# Patient Record
Sex: FEMALE | Race: WHITE | HISPANIC OR LATINO | Employment: OTHER | URBAN - METROPOLITAN AREA
[De-identification: names, ages, dates, MRNs, and addresses within clinical notes are randomized per-mention and may not be internally consistent; named-entity substitution may affect disease eponyms.]

---

## 2017-01-17 ENCOUNTER — ALLSCRIPTS OFFICE VISIT (OUTPATIENT)
Dept: OTHER | Facility: OTHER | Age: 42
End: 2017-01-17

## 2017-07-17 ENCOUNTER — HOSPITAL ENCOUNTER (EMERGENCY)
Facility: HOSPITAL | Age: 42
Discharge: HOME | End: 2017-07-18
Attending: EMERGENCY MEDICINE | Admitting: EMERGENCY MEDICINE
Payer: COMMERCIAL

## 2017-07-17 ENCOUNTER — ALLSCRIPTS OFFICE VISIT (OUTPATIENT)
Dept: OTHER | Facility: OTHER | Age: 42
End: 2017-07-17

## 2017-07-17 ENCOUNTER — APPOINTMENT (EMERGENCY)
Dept: RADIOLOGY | Facility: HOSPITAL | Age: 42
End: 2017-07-17
Payer: COMMERCIAL

## 2017-07-17 DIAGNOSIS — S89.92XA LEFT KNEE INJURY, INITIAL ENCOUNTER: ICD-10-CM

## 2017-07-17 DIAGNOSIS — S99.929A FOOT INJURY: Primary | ICD-10-CM

## 2017-07-17 PROCEDURE — 73564 X-RAY EXAM KNEE 4 OR MORE: CPT | Performed by: EMERGENCY MEDICINE

## 2017-07-17 PROCEDURE — 73610 X-RAY EXAM OF ANKLE: CPT | Performed by: EMERGENCY MEDICINE

## 2017-07-18 ENCOUNTER — APPOINTMENT (EMERGENCY)
Dept: RADIOLOGY | Facility: HOSPITAL | Age: 42
End: 2017-07-18
Payer: COMMERCIAL

## 2017-07-18 VITALS
TEMPERATURE: 98.9 F | BODY MASS INDEX: 23.92 KG/M2 | RESPIRATION RATE: 17 BRPM | OXYGEN SATURATION: 100 % | HEIGHT: 62 IN | SYSTOLIC BLOOD PRESSURE: 128 MMHG | WEIGHT: 130 LBS | HEART RATE: 77 BPM | DIASTOLIC BLOOD PRESSURE: 67 MMHG

## 2017-07-18 PROCEDURE — 73630 X-RAY EXAM OF FOOT: CPT

## 2017-07-18 PROCEDURE — 99283 EMERGENCY DEPT VISIT LOW MDM: CPT

## 2017-07-18 RX ORDER — IBUPROFEN 400 MG/1
400 TABLET ORAL ONCE
Status: COMPLETED | OUTPATIENT
Start: 2017-07-18 | End: 2017-07-18

## 2017-07-18 RX ORDER — NAPROXEN 500 MG/1
500 TABLET ORAL 2 TIMES DAILY WITH MEALS
Qty: 15 TABLET | Refills: 0 | Status: SHIPPED | OUTPATIENT
Start: 2017-07-18 | End: 2018-07-25 | Stop reason: ALTCHOICE

## 2017-07-18 RX ADMIN — IBUPROFEN 400 MG: 400 TABLET ORAL at 00:45

## 2018-01-13 VITALS
HEART RATE: 94 BPM | DIASTOLIC BLOOD PRESSURE: 65 MMHG | SYSTOLIC BLOOD PRESSURE: 101 MMHG | HEIGHT: 62 IN | WEIGHT: 163.06 LBS | BODY MASS INDEX: 30.01 KG/M2

## 2018-01-14 VITALS
BODY MASS INDEX: 28.89 KG/M2 | SYSTOLIC BLOOD PRESSURE: 118 MMHG | WEIGHT: 157 LBS | HEART RATE: 97 BPM | RESPIRATION RATE: 17 BRPM | HEIGHT: 62 IN | OXYGEN SATURATION: 99 % | DIASTOLIC BLOOD PRESSURE: 70 MMHG

## 2018-01-15 NOTE — MISCELLANEOUS
Message   Recorded as Task   Date: 03/10/2016 01:09 PM, Created By: Amanda Archibald   Task Name: Call Back   Assigned To: Oly Yusuf   Regarding Patient: Jamshid Hong, Status: Active   Comment:    Amanda Archibald - 10 Mar 2016 1:09 PM     TASK CREATED  Caller: josey, Parent; Care Coordination; [phone]    pts mom called said this medication is not working and you told her to call and let you know and you would raise the mg     amitriptylia currently taking 25 mg- 1 daily   Betsy Manriquez - 10 Mar 2016 1:09 PM     TASK REASSIGNED: Previously Assigned To [facility],team  pt uses Jimmie Garcia - 10 Mar 2016 3:08 PM     TASK EDITED  Spoke with Paulina's mom  She started to get headaches toward the end of the day  We will increase the amitriptyline by 10 mg up to 35 mg total        Plan  Migraine headache    · Amitriptyline HCl - 10 MG Oral Tablet;  Take 1 tablet by mouth at bedtime    Signatures   Electronically signed by : Marcella Solis MD; Mar 10 2016  3:08PM EST                       (Author)

## 2018-01-15 NOTE — MISCELLANEOUS
Message   Recorded as Task   Date: 01/11/2016 08:26 AM, Created By: David Jacques   Task Name: Call Back   Assigned To: Marguarite Beverage   Regarding Patient: Areli Rust, Status: Active   Comment:    David Jacques - 11 Jan 2016 8:26 AM     TASK CREATED  PAULINA CALLED SAID THIS MEDICATION IS NOT COVERED UNDER HER INSURANCE AND ITS TO EXPENSIVE, PATIENT ASKED IF SHE COULD GET SOMETHING SIMILAR THAT WOULD BE COVERED  PTS # [phone]  SHE USES [facility] IN [facility]  Marguarite Beverage - 11 Jan 2016 3:51 PM     TASK EDITED  I spoke with Paulina's mother reports that the Depakote sprinkles are not covered under her policy   We'll try the regular 125 mg Depakote pills        Plan  Migraine headache    · Divalproex Sodium 125 MG Oral Tablet Delayed Release; TAKE 1 TABLET EVERY  12 HOURS WITH FOOD    Signatures   Electronically signed by : Latoya Treviño MD; Jan 11 2016  3:51PM EST                       (Author)

## 2018-03-07 DIAGNOSIS — G43.009 MIGRAINE WITHOUT AURA AND WITHOUT STATUS MIGRAINOSUS, NOT INTRACTABLE: Primary | ICD-10-CM

## 2018-03-07 RX ORDER — DIVALPROEX SODIUM 125 MG/1
TABLET, DELAYED RELEASE ORAL
Qty: 60 TABLET | Refills: 1 | Status: SHIPPED | OUTPATIENT
Start: 2018-03-07 | End: 2018-05-17 | Stop reason: SDUPTHER

## 2018-05-17 DIAGNOSIS — G43.009 MIGRAINE WITHOUT AURA AND WITHOUT STATUS MIGRAINOSUS, NOT INTRACTABLE: ICD-10-CM

## 2018-05-17 RX ORDER — DIVALPROEX SODIUM 125 MG/1
TABLET, DELAYED RELEASE ORAL
Qty: 60 TABLET | Refills: 5 | Status: SHIPPED | OUTPATIENT
Start: 2018-05-17 | End: 2018-11-05

## 2018-05-31 DIAGNOSIS — G43.009 MIGRAINE WITHOUT AURA AND WITHOUT STATUS MIGRAINOSUS, NOT INTRACTABLE: Primary | ICD-10-CM

## 2018-05-31 RX ORDER — AMITRIPTYLINE HYDROCHLORIDE 50 MG/1
50 TABLET, FILM COATED ORAL
Qty: 30 TABLET | Refills: 5 | Status: SHIPPED | OUTPATIENT
Start: 2018-05-31 | End: 2018-11-21 | Stop reason: SDUPTHER

## 2018-07-25 ENCOUNTER — OFFICE VISIT (OUTPATIENT)
Dept: NEUROLOGY | Facility: CLINIC | Age: 43
End: 2018-07-25
Payer: COMMERCIAL

## 2018-07-25 VITALS
HEIGHT: 62 IN | SYSTOLIC BLOOD PRESSURE: 100 MMHG | DIASTOLIC BLOOD PRESSURE: 60 MMHG | WEIGHT: 162.8 LBS | HEART RATE: 90 BPM | BODY MASS INDEX: 29.96 KG/M2

## 2018-07-25 DIAGNOSIS — G43.009 MIGRAINE WITHOUT AURA AND WITHOUT STATUS MIGRAINOSUS, NOT INTRACTABLE: Primary | ICD-10-CM

## 2018-07-25 PROCEDURE — 99213 OFFICE O/P EST LOW 20 MIN: CPT | Performed by: PSYCHIATRY & NEUROLOGY

## 2018-07-25 RX ORDER — SUMATRIPTAN 50 MG/1
50 TABLET, FILM COATED ORAL 2 TIMES DAILY PRN
COMMUNITY
Start: 2018-06-26 | End: 2018-07-25 | Stop reason: SDUPTHER

## 2018-07-25 RX ORDER — SUMATRIPTAN 50 MG/1
50 TABLET, FILM COATED ORAL 2 TIMES DAILY PRN
Qty: 9 TABLET | Refills: 5 | Status: SHIPPED | OUTPATIENT
Start: 2018-07-25 | End: 2019-02-04 | Stop reason: SDUPTHER

## 2018-07-25 NOTE — PROGRESS NOTES
Addi Dominguez is a 38 y o  female returns with history of migraine headache    Assessment:  1  Migraine without aura and without status migrainosus, not intractable        Plan:  Continue current medication  Follow-up 6 months    Discussion:  Diabetes headaches remain under good control with present management  She will continue Imitrex as needed for her headaches and she will follow up with me in 6 months      Subjective:    HPI  Tila Knapp returns in follow-up today with her mother  Her headaches remain under good control having about 1 headache a week  She was not finding the Maxalt to be effective in stopping her headache under primary doctor gave her 50 mg of Imitrex and this seems to be helpful  Typically 1 pill gets rid of her headache  She tolerates it well  She denies any other medical issues      Past Medical History:   Diagnosis Date    Hearing loss - left     Migraine     Seizures (HCC)        Family History:  Family History   Problem Relation Age of Onset    Cancer Mother     Arthritis Mother     Liver disease Father        Past Surgical History:  History reviewed  No pertinent surgical history  Social History:   reports that she has never smoked  She has never used smokeless tobacco  She reports that she does not drink alcohol or use drugs  Allergies:  Patient has no known allergies        Current Outpatient Prescriptions:     amitriptyline (ELAVIL) 50 mg tablet, Take 1 tablet (50 mg total) by mouth daily at bedtime, Disp: 30 tablet, Rfl: 5    divalproex sodium (DEPAKOTE) 125 mg EC tablet, TAKE 1 TABLET BY MOUTH EVERY 12 HOURS WITH FOOD, Disp: 60 tablet, Rfl: 5    SUMAtriptan (IMITREX) 50 mg tablet, Take 50 mg by mouth 2 (two) times a day as needed, Disp: , Rfl:     I have reviewed the past medical, social and family history, current medications, allergies, vitals, review of systems and updated this information as appropriate today     Objective:    Vitals:  Blood pressure 100/60, pulse 90, height 5' 1 75" (1 568 m), weight 73 8 kg (162 lb 12 8 oz)  Physical Exam    Neurological Exam  GENERAL:  Well-developed well-nourished woman in no acute distress  HEENT/NECK: Head is atraumatic normocephalic, neck is supple  NEUROLOGIC:  Mental Status: Awake and alert without aphasia  Cranial Nerves: Extraocular movements are full  Face is symmetrical  Motor:  No drift is noted on arm extension  Coordination:  Finger-to-nose testing is performed accurately  Romberg is negative  Gait is stable  Reflexes:  1/4 and symmetrical            ROS:    Review of Systems   Constitutional: Negative for chills, fatigue and fever  HENT: Negative for congestion, sinus pain, sinus pressure, tinnitus and trouble swallowing  Eyes: Negative for pain, discharge and visual disturbance  Respiratory: Negative for cough, shortness of breath and wheezing  Cardiovascular: Negative  Gastrointestinal: Positive for abdominal pain  Negative for abdominal distention, nausea and vomiting  Endocrine: Negative for cold intolerance and heat intolerance  Genitourinary: Negative for difficulty urinating, frequency, hematuria and urgency  Musculoskeletal: Positive for arthralgias  Negative for back pain, gait problem, neck pain and neck stiffness  Skin: Negative for rash and wound  Allergic/Immunologic: Negative for environmental allergies and food allergies  Neurological: Negative  Hematological: Negative  Psychiatric/Behavioral: Negative for confusion, decreased concentration and sleep disturbance

## 2018-11-01 ENCOUNTER — TELEPHONE (OUTPATIENT)
Dept: NEUROLOGY | Facility: CLINIC | Age: 43
End: 2018-11-01

## 2018-11-01 NOTE — TELEPHONE ENCOUNTER
Patient's mother is requesting the depakote sprinkles, she states they are no longer able to get the med without flavoring added (which causes patient stomach upset) was told the  will no longer be making it without flavors

## 2018-11-05 DIAGNOSIS — G43.009 MIGRAINE WITHOUT AURA AND WITHOUT STATUS MIGRAINOSUS, NOT INTRACTABLE: ICD-10-CM

## 2018-11-05 RX ORDER — DIVALPROEX SODIUM 250 MG/1
250 TABLET, EXTENDED RELEASE ORAL DAILY
Qty: 30 TABLET | Refills: 5 | Status: SHIPPED | OUTPATIENT
Start: 2018-11-05 | End: 2018-11-28

## 2018-11-05 NOTE — TELEPHONE ENCOUNTER
I spoke with Myriam Cande in her mother and will put her on the 250 mg extended-release tablet to be taken 1 at bedtime

## 2018-11-21 DIAGNOSIS — G43.009 MIGRAINE WITHOUT AURA AND WITHOUT STATUS MIGRAINOSUS, NOT INTRACTABLE: ICD-10-CM

## 2018-11-21 RX ORDER — AMITRIPTYLINE HYDROCHLORIDE 50 MG/1
TABLET, FILM COATED ORAL
Qty: 30 TABLET | Refills: 5 | Status: SHIPPED | OUTPATIENT
Start: 2018-11-21 | End: 2019-05-20 | Stop reason: SDUPTHER

## 2018-11-28 ENCOUNTER — TELEPHONE (OUTPATIENT)
Dept: NEUROLOGY | Facility: CLINIC | Age: 43
End: 2018-11-28

## 2018-11-28 DIAGNOSIS — G43.009 MIGRAINE WITHOUT AURA AND WITHOUT STATUS MIGRAINOSUS, NOT INTRACTABLE: Primary | ICD-10-CM

## 2018-11-28 RX ORDER — DIVALPROEX SODIUM 125 MG/1
125 TABLET, DELAYED RELEASE ORAL EVERY 12 HOURS SCHEDULED
Qty: 60 TABLET | Refills: 5 | Status: SHIPPED | OUTPATIENT
Start: 2018-11-28 | End: 2018-11-29

## 2018-11-28 NOTE — TELEPHONE ENCOUNTER
Mother Morgan Salguero called questioning if she can cut depakote in half so pt can take BID as she feels taking hs not working  She is starting to get HA's again  I advised her  extended release cannot be cut  Mother would like pt switched to IR  Please advise       [phone]

## 2018-11-29 DIAGNOSIS — G43.009 MIGRAINE WITHOUT AURA AND WITHOUT STATUS MIGRAINOSUS, NOT INTRACTABLE: ICD-10-CM

## 2018-11-29 RX ORDER — DIVALPROEX SODIUM 125 MG/1
125 CAPSULE, COATED PELLETS ORAL EVERY 12 HOURS SCHEDULED
Qty: 60 CAPSULE | Refills: 5 | Status: SHIPPED | OUTPATIENT
Start: 2018-11-29 | End: 2019-05-18 | Stop reason: SDUPTHER

## 2018-11-29 NOTE — TELEPHONE ENCOUNTER
The reason these changes began a month ago was because she did not want to be on the sprinkles anymore because of the flavor ring    I sent another prescription to the pharmacy

## 2018-11-29 NOTE — TELEPHONE ENCOUNTER
Patient's mother requesting rx for depakote sprinkles  She did not  the rx that was sent yesterday because she states it is flavored and the patient can't tolerate this  Please send sprinkles to the [facility] on file

## 2019-01-10 ENCOUNTER — TELEPHONE (OUTPATIENT)
Dept: NEUROLOGY | Facility: CLINIC | Age: 44
End: 2019-01-10

## 2019-02-04 ENCOUNTER — OFFICE VISIT (OUTPATIENT)
Dept: NEUROLOGY | Facility: CLINIC | Age: 44
End: 2019-02-04
Payer: COMMERCIAL

## 2019-02-04 VITALS
BODY MASS INDEX: 30.4 KG/M2 | HEART RATE: 94 BPM | HEIGHT: 62 IN | DIASTOLIC BLOOD PRESSURE: 80 MMHG | WEIGHT: 165.2 LBS | SYSTOLIC BLOOD PRESSURE: 110 MMHG

## 2019-02-04 DIAGNOSIS — G43.009 MIGRAINE WITHOUT AURA AND WITHOUT STATUS MIGRAINOSUS, NOT INTRACTABLE: Primary | ICD-10-CM

## 2019-02-04 PROCEDURE — 99213 OFFICE O/P EST LOW 20 MIN: CPT | Performed by: PSYCHIATRY & NEUROLOGY

## 2019-02-04 RX ORDER — SUMATRIPTAN 50 MG/1
50 TABLET, FILM COATED ORAL 2 TIMES DAILY PRN
Qty: 9 TABLET | Refills: 5 | Status: SHIPPED | OUTPATIENT
Start: 2019-02-04 | End: 2020-05-15

## 2019-02-04 RX ORDER — SUMATRIPTAN 50 MG/1
50 TABLET, FILM COATED ORAL
COMMUNITY
Start: 2018-08-01 | End: 2019-02-04 | Stop reason: SDUPTHER

## 2019-02-04 NOTE — PROGRESS NOTES
Laura Garza is a 41 y o  female returns in follow-up today with history of migraine headache    Assessment:  1  Migraine without aura and without status migrainosus, not intractable        Plan:  Continue Depakote and amitriptyline daily for prophylaxis  Imitrex as needed for breakthrough headache  Follow-up 6 months    Discussion:  Jose Padorn returns in follow-up today with her mother  They report that her headaches are currently under good control with current medications  She uses Imitrex a few times a month and it is effective in stopping her headache  She will continue with current management and follow up in 6 months      Subjective:    HPI  Jose Padron returns in follow-up today with her mother  They report that her headaches are under good control with current management  She remains on Depakote and amitriptyline daily for prophylaxis and tolerates these well  She gets a headache about 2 or 3 times a month and finds that 50 mg of Imitrex is effective in stopping her headache when she gets it  She has no problems tolerating her medications  They deny any new medical issues      Past Medical History:   Diagnosis Date    Hearing loss - left     Migraine     Seizures ([facility] [address] )        Family History:  Family History   Problem Relation Age of Onset    Cancer Mother     Arthritis Mother     Liver disease Father        Past Surgical History:  History reviewed  No pertinent surgical history  Social History:   reports that she has never smoked  She has never used smokeless tobacco  She reports that she does not drink alcohol or use drugs  Allergies:  Patient has no known allergies        Current Outpatient Prescriptions:     amitriptyline (ELAVIL) 50 mg tablet, TAKE 1 TABLET BY MOUTH AT BEDTIME, Disp: 30 tablet, Rfl: 5    divalproex sodium (DEPAKOTE SPRINKLE) 125 MG capsule, Take 1 capsule (125 mg total) by mouth every 12 (twelve) hours, Disp: 60 capsule, Rfl: 5    SUMAtriptan (IMITREX) 50 mg tablet, Take 1 tablet (50 mg total) by mouth 2 (two) times a day as needed for migraine, Disp: 9 tablet, Rfl: 5    I have reviewed the past medical, social and family history, current medications, allergies, vitals, review of systems and updated this information as appropriate today     Objective:    Vitals:  Blood pressure 110/80, pulse 94, height 5' 1 75" (1 568 m), weight 74 9 kg (165 lb 3 2 oz)  Physical Exam    Neurological Exam  GENERAL:  Well-developed well-nourished woman in no acute distress  HEENT/NECK: Head is atraumatic normocephalic, neck is supple  NEUROLOGIC:  Mental Status: Awake and alert without aphasia  Cranial Nerves: Extraocular movements are full  Face is symmetrical  Motor:  No drift is noted on arm extension  Coordination:  Finger-to-nose testing is performed accurately  Romberg is negative  Gait is stable  Reflexes:  2/4 and symmetrical            ROS:    Review of Systems   Constitutional: Positive for fatigue  HENT: Positive for hearing loss  Eyes: Negative  Respiratory: Negative  Cardiovascular: Negative  Gastrointestinal: Negative  Negative for nausea and vomiting  Endocrine: Negative  Genitourinary: Negative  Negative for difficulty urinating, frequency and urgency  Musculoskeletal: Positive for arthralgias (Left knee pain)  Skin: Negative  Allergic/Immunologic: Negative  Neurological: Positive for facial asymmetry, speech difficulty, light-headedness, numbness and headaches  Hematological: Bruises/bleeds easily  Psychiatric/Behavioral: Negative  Negative for confusion, decreased concentration, hallucinations and sleep disturbance

## 2019-05-18 DIAGNOSIS — G43.009 MIGRAINE WITHOUT AURA AND WITHOUT STATUS MIGRAINOSUS, NOT INTRACTABLE: ICD-10-CM

## 2019-05-20 DIAGNOSIS — G43.009 MIGRAINE WITHOUT AURA AND WITHOUT STATUS MIGRAINOSUS, NOT INTRACTABLE: ICD-10-CM

## 2019-05-20 RX ORDER — AMITRIPTYLINE HYDROCHLORIDE 50 MG/1
50 TABLET, FILM COATED ORAL
Qty: 30 TABLET | Refills: 5 | Status: SHIPPED | OUTPATIENT
Start: 2019-05-20 | End: 2019-11-14 | Stop reason: SDUPTHER

## 2019-05-20 RX ORDER — DIVALPROEX SODIUM 125 MG/1
CAPSULE, COATED PELLETS ORAL
Qty: 60 CAPSULE | Refills: 0 | Status: SHIPPED | OUTPATIENT
Start: 2019-05-20 | End: 2019-06-17 | Stop reason: SDUPTHER

## 2019-06-17 DIAGNOSIS — G43.009 MIGRAINE WITHOUT AURA AND WITHOUT STATUS MIGRAINOSUS, NOT INTRACTABLE: ICD-10-CM

## 2019-06-17 RX ORDER — DIVALPROEX SODIUM 125 MG/1
CAPSULE, COATED PELLETS ORAL
Qty: 60 CAPSULE | Refills: 5 | Status: SHIPPED | OUTPATIENT
Start: 2019-06-17 | End: 2019-12-14 | Stop reason: SDUPTHER

## 2019-08-05 ENCOUNTER — TELEPHONE (OUTPATIENT)
Dept: NEUROLOGY | Facility: CLINIC | Age: 44
End: 2019-08-05

## 2019-08-05 ENCOUNTER — OFFICE VISIT (OUTPATIENT)
Dept: NEUROLOGY | Facility: CLINIC | Age: 44
End: 2019-08-05
Payer: COMMERCIAL

## 2019-08-05 VITALS
BODY MASS INDEX: 29.19 KG/M2 | WEIGHT: 158.6 LBS | DIASTOLIC BLOOD PRESSURE: 70 MMHG | HEIGHT: 62 IN | HEART RATE: 92 BPM | SYSTOLIC BLOOD PRESSURE: 100 MMHG

## 2019-08-05 DIAGNOSIS — G43.009 MIGRAINE WITHOUT AURA AND WITHOUT STATUS MIGRAINOSUS, NOT INTRACTABLE: Primary | ICD-10-CM

## 2019-08-05 PROCEDURE — 99213 OFFICE O/P EST LOW 20 MIN: CPT | Performed by: PSYCHIATRY & NEUROLOGY

## 2019-08-05 NOTE — PROGRESS NOTES
Maicol Engel is a 41 y o  female returns in follow-up today with history of migraine headache    Assessment:  1  Migraine without aura and without status migrainosus, not intractable        Plan:  Continue Depakote and Elavil  Imitrex as needed  Follow-up 6 months    Discussion:  Paulina some migraine headaches remain under good control occurring less than once a week with current management  Imitrex is effective in stopping the headache  She will continue with these and follow-up with me in 6 months      Subjective:    MICHELE Hess returns in follow-up today accompanied by her mother  She reports that she has been doing well with respect to her headaches  She states she has headache less than once a week and when she gets 1 the Imitrex is effective in stopping it  She continues on Elavil and Depakote as preventive treatments and the seem to be effective  She has no problems tolerating them  She denies any other health issues  Past Medical History:   Diagnosis Date    Hearing loss - left     Migraine     Seizures (HCC)        Family History:  Family History   Problem Relation Age of Onset    Cancer Mother     Arthritis Mother     Liver disease Father        Past Surgical History:  History reviewed  No pertinent surgical history  Social History:   reports that she has never smoked  She has never used smokeless tobacco  She reports that she does not drink alcohol or use drugs  Allergies:  Patient has no known allergies        Current Outpatient Medications:     amitriptyline (ELAVIL) 50 mg tablet, Take 1 tablet (50 mg total) by mouth daily at bedtime, Disp: 30 tablet, Rfl: 5    divalproex sodium (DEPAKOTE SPRINKLE) 125 MG capsule, TAKE 1 CAPSULE(125 MG) BY MOUTH EVERY 12 HOURS, Disp: 60 capsule, Rfl: 5    SUMAtriptan (IMITREX) 50 mg tablet, Take 1 tablet (50 mg total) by mouth 2 (two) times a day as needed for migraine, Disp: 9 tablet, Rfl: 5    I have reviewed the past medical, social and family history, current medications, allergies, vitals, review of systems and updated this information as appropriate today     Objective:    Vitals:  Blood pressure 100/70, pulse 92, height 5' 1 75" (1 568 m), weight 71 9 kg (158 lb 9 6 oz)  Physical Exam    Neurological Exam  GENERAL:  Well-developed well-nourished woman in no acute distress  HEENT/NECK: Head is atraumatic normocephalic, neck is supple  NEUROLOGIC:  Mental Status: Awake and alert without aphasia  Cranial Nerves: Extraocular movements are full  Face is symmetrical  Motor:  No drift is noted on arm extension  Coordination:  Finger-to-nose testing is performed accurately  Romberg is negative  Gait is stable  Reflexes:  2/4 and symmetrical            ROS:    Review of Systems   Constitutional: Negative  Negative for appetite change and fever  HENT: Negative  Negative for hearing loss, tinnitus, trouble swallowing and voice change  Eyes: Negative  Negative for photophobia and pain  Respiratory: Negative  Negative for shortness of breath  Cardiovascular: Negative  Negative for palpitations  Gastrointestinal: Negative  Negative for nausea and vomiting  Endocrine: Negative  Negative for cold intolerance and heat intolerance  Genitourinary: Negative  Negative for dysuria, frequency and urgency  Musculoskeletal: Negative  Negative for myalgias and neck pain  Skin: Negative  Negative for rash  Neurological: Negative  Negative for dizziness, tremors, seizures, syncope, facial asymmetry, speech difficulty, weakness, light-headedness, numbness and headaches  Hematological: Negative  Does not bruise/bleed easily  Psychiatric/Behavioral: Negative  Negative for confusion, hallucinations and sleep disturbance  All other systems reviewed and are negative

## 2019-11-14 DIAGNOSIS — G43.009 MIGRAINE WITHOUT AURA AND WITHOUT STATUS MIGRAINOSUS, NOT INTRACTABLE: ICD-10-CM

## 2019-11-14 RX ORDER — AMITRIPTYLINE HYDROCHLORIDE 50 MG/1
TABLET, FILM COATED ORAL
Qty: 30 TABLET | Refills: 5 | Status: SHIPPED | OUTPATIENT
Start: 2019-11-14 | End: 2020-05-08

## 2019-12-14 DIAGNOSIS — G43.009 MIGRAINE WITHOUT AURA AND WITHOUT STATUS MIGRAINOSUS, NOT INTRACTABLE: ICD-10-CM

## 2019-12-16 RX ORDER — DIVALPROEX SODIUM 125 MG/1
CAPSULE, COATED PELLETS ORAL
Qty: 60 CAPSULE | Refills: 0 | Status: SHIPPED | OUTPATIENT
Start: 2019-12-16 | End: 2020-01-13

## 2020-01-13 DIAGNOSIS — G43.009 MIGRAINE WITHOUT AURA AND WITHOUT STATUS MIGRAINOSUS, NOT INTRACTABLE: ICD-10-CM

## 2020-01-13 RX ORDER — DIVALPROEX SODIUM 125 MG/1
CAPSULE, COATED PELLETS ORAL
Qty: 60 CAPSULE | Refills: 5 | Status: SHIPPED | OUTPATIENT
Start: 2020-01-13 | End: 2020-01-13

## 2020-01-13 RX ORDER — DIVALPROEX SODIUM 125 MG/1
CAPSULE, COATED PELLETS ORAL
Qty: 180 CAPSULE | Refills: 5 | Status: SHIPPED | OUTPATIENT
Start: 2020-01-13 | End: 2021-01-28

## 2020-05-08 ENCOUNTER — OFFICE VISIT (OUTPATIENT)
Dept: NEUROLOGY | Facility: CLINIC | Age: 45
End: 2020-05-08
Payer: COMMERCIAL

## 2020-05-08 VITALS
HEART RATE: 74 BPM | SYSTOLIC BLOOD PRESSURE: 102 MMHG | WEIGHT: 162 LBS | BODY MASS INDEX: 29.81 KG/M2 | HEIGHT: 62 IN | DIASTOLIC BLOOD PRESSURE: 60 MMHG

## 2020-05-08 DIAGNOSIS — G43.009 MIGRAINE WITHOUT AURA AND WITHOUT STATUS MIGRAINOSUS, NOT INTRACTABLE: ICD-10-CM

## 2020-05-08 DIAGNOSIS — G43.009 MIGRAINE WITHOUT AURA AND WITHOUT STATUS MIGRAINOSUS, NOT INTRACTABLE: Primary | ICD-10-CM

## 2020-05-08 PROCEDURE — 99213 OFFICE O/P EST LOW 20 MIN: CPT | Performed by: PSYCHIATRY & NEUROLOGY

## 2020-05-08 RX ORDER — AMITRIPTYLINE HYDROCHLORIDE 50 MG/1
TABLET, FILM COATED ORAL
Qty: 30 TABLET | Refills: 5 | Status: SHIPPED | OUTPATIENT
Start: 2020-05-08 | End: 2020-10-30

## 2020-05-14 DIAGNOSIS — G43.009 MIGRAINE WITHOUT AURA AND WITHOUT STATUS MIGRAINOSUS, NOT INTRACTABLE: ICD-10-CM

## 2020-05-15 RX ORDER — SUMATRIPTAN 50 MG/1
TABLET, FILM COATED ORAL
Qty: 9 TABLET | Refills: 5 | Status: SHIPPED | OUTPATIENT
Start: 2020-05-15 | End: 2021-06-21 | Stop reason: SDUPTHER

## 2020-10-30 DIAGNOSIS — G43.009 MIGRAINE WITHOUT AURA AND WITHOUT STATUS MIGRAINOSUS, NOT INTRACTABLE: ICD-10-CM

## 2020-10-30 RX ORDER — AMITRIPTYLINE HYDROCHLORIDE 50 MG/1
TABLET, FILM COATED ORAL
Qty: 30 TABLET | Refills: 5 | Status: SHIPPED | OUTPATIENT
Start: 2020-10-30 | End: 2021-04-28

## 2020-11-09 ENCOUNTER — OFFICE VISIT (OUTPATIENT)
Dept: NEUROLOGY | Facility: CLINIC | Age: 45
End: 2020-11-09
Payer: COMMERCIAL

## 2020-11-09 VITALS
WEIGHT: 160 LBS | HEIGHT: 62 IN | SYSTOLIC BLOOD PRESSURE: 102 MMHG | HEART RATE: 92 BPM | DIASTOLIC BLOOD PRESSURE: 70 MMHG | BODY MASS INDEX: 29.44 KG/M2

## 2020-11-09 DIAGNOSIS — G43.009 MIGRAINE WITHOUT AURA AND WITHOUT STATUS MIGRAINOSUS, NOT INTRACTABLE: Primary | ICD-10-CM

## 2020-11-09 PROCEDURE — 99213 OFFICE O/P EST LOW 20 MIN: CPT | Performed by: PSYCHIATRY & NEUROLOGY

## 2020-11-10 ENCOUNTER — TELEPHONE (OUTPATIENT)
Dept: NEUROLOGY | Facility: CLINIC | Age: 45
End: 2020-11-10

## 2020-11-10 ENCOUNTER — APPOINTMENT (OUTPATIENT)
Dept: LAB | Facility: HOSPITAL | Age: 45
End: 2020-11-10
Attending: PSYCHIATRY & NEUROLOGY
Payer: COMMERCIAL

## 2020-11-10 DIAGNOSIS — G43.009 MIGRAINE WITHOUT AURA AND WITHOUT STATUS MIGRAINOSUS, NOT INTRACTABLE: ICD-10-CM

## 2020-11-10 LAB
ALBUMIN SERPL BCP-MCNC: 3.4 G/DL (ref 3.5–5)
ALP SERPL-CCNC: 50 U/L (ref 46–116)
ALT SERPL W P-5'-P-CCNC: 17 U/L (ref 12–78)
AST SERPL W P-5'-P-CCNC: 9 U/L (ref 5–45)
BASOPHILS # BLD AUTO: 0.03 THOUSANDS/ΜL (ref 0–0.1)
BASOPHILS NFR BLD AUTO: 1 % (ref 0–1)
BILIRUB DIRECT SERPL-MCNC: 0.09 MG/DL (ref 0–0.2)
BILIRUB SERPL-MCNC: 0.3 MG/DL (ref 0.2–1)
EOSINOPHIL # BLD AUTO: 0.03 THOUSAND/ΜL (ref 0–0.61)
EOSINOPHIL NFR BLD AUTO: 1 % (ref 0–6)
ERYTHROCYTE [DISTWIDTH] IN BLOOD BY AUTOMATED COUNT: 15.2 % (ref 11.6–15.1)
HCT VFR BLD AUTO: 34.7 % (ref 34.8–46.1)
HGB BLD-MCNC: 10.4 G/DL (ref 11.5–15.4)
IMM GRANULOCYTES # BLD AUTO: 0.01 THOUSAND/UL (ref 0–0.2)
IMM GRANULOCYTES NFR BLD AUTO: 0 % (ref 0–2)
LYMPHOCYTES # BLD AUTO: 1.12 THOUSANDS/ΜL (ref 0.6–4.47)
LYMPHOCYTES NFR BLD AUTO: 27 % (ref 14–44)
MCH RBC QN AUTO: 25.6 PG (ref 26.8–34.3)
MCHC RBC AUTO-ENTMCNC: 30 G/DL (ref 31.4–37.4)
MCV RBC AUTO: 85 FL (ref 82–98)
MONOCYTES # BLD AUTO: 0.24 THOUSAND/ΜL (ref 0.17–1.22)
MONOCYTES NFR BLD AUTO: 6 % (ref 4–12)
NEUTROPHILS # BLD AUTO: 2.74 THOUSANDS/ΜL (ref 1.85–7.62)
NEUTS SEG NFR BLD AUTO: 65 % (ref 43–75)
NRBC BLD AUTO-RTO: 0 /100 WBCS
PLATELET # BLD AUTO: 173 THOUSANDS/UL (ref 149–390)
PMV BLD AUTO: 8.8 FL (ref 8.9–12.7)
PROT SERPL-MCNC: 7.3 G/DL (ref 6.4–8.2)
RBC # BLD AUTO: 4.07 MILLION/UL (ref 3.81–5.12)
WBC # BLD AUTO: 4.17 THOUSAND/UL (ref 4.31–10.16)

## 2020-11-10 PROCEDURE — 80076 HEPATIC FUNCTION PANEL: CPT

## 2020-11-10 PROCEDURE — 85025 COMPLETE CBC W/AUTO DIFF WBC: CPT

## 2020-11-10 PROCEDURE — 36415 COLL VENOUS BLD VENIPUNCTURE: CPT

## 2021-01-28 DIAGNOSIS — G43.009 MIGRAINE WITHOUT AURA AND WITHOUT STATUS MIGRAINOSUS, NOT INTRACTABLE: ICD-10-CM

## 2021-01-28 RX ORDER — DIVALPROEX SODIUM 125 MG/1
CAPSULE, COATED PELLETS ORAL
Qty: 180 CAPSULE | Refills: 5 | Status: SHIPPED | OUTPATIENT
Start: 2021-01-28 | End: 2021-06-21 | Stop reason: SDUPTHER

## 2021-04-28 DIAGNOSIS — G43.009 MIGRAINE WITHOUT AURA AND WITHOUT STATUS MIGRAINOSUS, NOT INTRACTABLE: ICD-10-CM

## 2021-04-28 RX ORDER — AMITRIPTYLINE HYDROCHLORIDE 50 MG/1
TABLET, FILM COATED ORAL
Qty: 30 TABLET | Refills: 5 | Status: SHIPPED | OUTPATIENT
Start: 2021-04-28 | End: 2021-09-16

## 2021-06-07 ENCOUNTER — TELEPHONE (OUTPATIENT)
Dept: NEUROLOGY | Facility: CLINIC | Age: 46
End: 2021-06-07

## 2021-06-21 ENCOUNTER — OFFICE VISIT (OUTPATIENT)
Dept: NEUROLOGY | Facility: CLINIC | Age: 46
End: 2021-06-21
Payer: COMMERCIAL

## 2021-06-21 VITALS
SYSTOLIC BLOOD PRESSURE: 100 MMHG | WEIGHT: 154 LBS | HEIGHT: 62 IN | DIASTOLIC BLOOD PRESSURE: 60 MMHG | HEART RATE: 90 BPM | BODY MASS INDEX: 28.34 KG/M2

## 2021-06-21 DIAGNOSIS — G43.009 MIGRAINE WITHOUT AURA AND WITHOUT STATUS MIGRAINOSUS, NOT INTRACTABLE: ICD-10-CM

## 2021-06-21 PROCEDURE — 99213 OFFICE O/P EST LOW 20 MIN: CPT | Performed by: PSYCHIATRY & NEUROLOGY

## 2021-06-21 RX ORDER — DIVALPROEX SODIUM 125 MG/1
CAPSULE, COATED PELLETS ORAL
Qty: 180 CAPSULE | Refills: 3 | Status: SHIPPED | OUTPATIENT
Start: 2021-06-21 | End: 2022-02-14 | Stop reason: SDUPTHER

## 2021-06-21 RX ORDER — SUMATRIPTAN 50 MG/1
TABLET, FILM COATED ORAL
Qty: 9 TABLET | Refills: 5 | Status: SHIPPED | OUTPATIENT
Start: 2021-06-21 | End: 2022-02-02

## 2021-06-21 NOTE — PROGRESS NOTES
Osvaldo Warren is a 39 y o  female  Returns in follow-up today with history of migraine headache    Assessment:  1  Migraine without aura and without status migrainosus, not intractable        Plan:   continue Depakote and amitriptyline   Imitrex as needed   Follow-up 6 months    Discussion:   Natalie Escudero reports that she has been doing well from the standpoint of her migraine headaches, requiring Imitrex about once a month  She tolerates her Depakote and amitriptyline without adverse effects and will continue these  I will see her back in follow-up in 6 months      Subjective:    HPI   Natalie Escudero returns in follow-up today accompanied by her mother  She reports that her last she has been doing well  She states she infrequently gets headaches, using Imitrex about once a month  She takes the Depakote and amitriptyline without adverse effect  She denies any new health issues  She has received her 1st COVID vaccine and anticipates getting the 2nd 1  before the end of the month      Past Medical History:   Diagnosis Date    Hearing loss - left     Migraine     Seizures (HCC)        Family History:  Family History   Problem Relation Age of Onset    Cancer Mother     Arthritis Mother     Liver disease Father        Past Surgical History:  No past surgical history on file  Social History:   reports that she has never smoked  She has never used smokeless tobacco  She reports that she does not drink alcohol and does not use drugs  Allergies:  Patient has no known allergies  Current Outpatient Medications:     amitriptyline (ELAVIL) 50 mg tablet, TAKE 1 TABLET BY MOUTH EVERY NIGHT AT BEDTIME, Disp: 30 tablet, Rfl: 5    divalproex sodium (DEPAKOTE SPRINKLE) 125 MG capsule, One b i d , Disp: 180 capsule, Rfl: 3    SUMAtriptan (IMITREX) 50 mg tablet, One p o  at headache onset, may repeat 1 after 2 hours   p r n  maximum 2/24 hours, Disp: 9 tablet, Rfl: 5     I have reviewed the past medical, social and family history, current medications, allergies, vitals, review of systems and updated this information as appropriate today     Objective:    Vitals:  Blood pressure 100/60, pulse 90, height 5' 2" (1 575 m), weight 69 9 kg (154 lb)  Physical Exam    Neurological Exam   GENERAL:  Well-developed well-nourished woman in no acute distress  HEENT/NECK: Head is atraumatic normocephalic, neck is supple  NEUROLOGIC:  Mental Status: Awake and alert without aphasia  Cranial Nerves: Extraocular movements are full  Face is symmetrical  Motor:  No drift is noted on arm extension  Coordination:  Gait is stable            ROS:    Review of Systems   Constitutional: Negative  Negative for appetite change and fever  HENT: Negative  Negative for hearing loss, tinnitus, trouble swallowing and voice change  Eyes: Negative  Negative for photophobia and pain  Respiratory: Negative  Negative for shortness of breath  Cardiovascular: Negative  Negative for palpitations  Gastrointestinal: Negative  Negative for nausea and vomiting  Endocrine: Negative  Negative for cold intolerance  Genitourinary: Negative  Negative for dysuria, frequency and urgency  Musculoskeletal: Negative  Negative for myalgias and neck pain  Skin: Negative  Negative for rash  Neurological: Negative  Negative for dizziness, tremors, seizures, syncope, facial asymmetry, speech difficulty, weakness, light-headedness, numbness and headaches  Hematological: Negative  Does not bruise/bleed easily  Psychiatric/Behavioral: Negative  Negative for confusion, hallucinations and sleep disturbance

## 2021-09-15 DIAGNOSIS — G43.009 MIGRAINE WITHOUT AURA AND WITHOUT STATUS MIGRAINOSUS, NOT INTRACTABLE: ICD-10-CM

## 2021-09-16 RX ORDER — AMITRIPTYLINE HYDROCHLORIDE 50 MG/1
TABLET, FILM COATED ORAL
Qty: 30 TABLET | Refills: 5 | Status: SHIPPED | OUTPATIENT
Start: 2021-09-16 | End: 2021-11-08

## 2021-11-07 DIAGNOSIS — G43.009 MIGRAINE WITHOUT AURA AND WITHOUT STATUS MIGRAINOSUS, NOT INTRACTABLE: ICD-10-CM

## 2021-11-08 RX ORDER — AMITRIPTYLINE HYDROCHLORIDE 50 MG/1
TABLET, FILM COATED ORAL
Qty: 30 TABLET | Refills: 5 | Status: SHIPPED | OUTPATIENT
Start: 2021-11-08 | End: 2022-05-02

## 2021-11-18 ENCOUNTER — TELEPHONE (OUTPATIENT)
Dept: FAMILY MEDICINE CLINIC | Facility: CLINIC | Age: 46
End: 2021-11-18

## 2021-12-22 ENCOUNTER — OFFICE VISIT (OUTPATIENT)
Dept: NEUROLOGY | Facility: CLINIC | Age: 46
End: 2021-12-22
Payer: COMMERCIAL

## 2021-12-22 VITALS
HEIGHT: 62 IN | BODY MASS INDEX: 28.16 KG/M2 | SYSTOLIC BLOOD PRESSURE: 126 MMHG | WEIGHT: 153 LBS | HEART RATE: 76 BPM | TEMPERATURE: 97.3 F | DIASTOLIC BLOOD PRESSURE: 86 MMHG

## 2021-12-22 DIAGNOSIS — G43.009 MIGRAINE WITHOUT AURA AND WITHOUT STATUS MIGRAINOSUS, NOT INTRACTABLE: Primary | ICD-10-CM

## 2021-12-22 PROCEDURE — 99213 OFFICE O/P EST LOW 20 MIN: CPT | Performed by: PSYCHIATRY & NEUROLOGY

## 2022-02-01 ENCOUNTER — TELEPHONE (OUTPATIENT)
Dept: NEUROLOGY | Facility: CLINIC | Age: 47
End: 2022-02-01

## 2022-02-01 NOTE — TELEPHONE ENCOUNTER
Pt's mom Hoda Greco called and states that pt was diagnosed w/ ear infection a couple months ago  Her ear infection all cleared but pt continue to get migraines  Worsening for the past week  Location/Description: pressure around her whole head    Pain scale: "it's bad"    Associated symptoms: nausea    Precipitating factors: unknown    Current migraine medications are confirmed as:  Amitriptyline 50 mg at bedtime  depakote 125 mg bid  Sumatriptan 50 mg at onset of migraine  Medications tried in the past? Not tried decadron or olanzapine     States that sumatriptan usually helps w/ her migraines but lately this med has not helped at all   Asking if you can prescribe higher dose or order alt abortive meds be sent to [address]       [phone] ok to leave detailed message

## 2022-02-02 DIAGNOSIS — G43.009 MIGRAINE WITHOUT AURA AND WITHOUT STATUS MIGRAINOSUS, NOT INTRACTABLE: Primary | ICD-10-CM

## 2022-02-02 RX ORDER — PROMETHAZINE HYDROCHLORIDE 25 MG/1
25 SUPPOSITORY RECTAL EVERY 6 HOURS PRN
Qty: 4 EACH | Refills: 5 | Status: SHIPPED | OUTPATIENT
Start: 2022-02-02

## 2022-02-02 RX ORDER — SUMATRIPTAN 100 MG/1
TABLET, FILM COATED ORAL
Qty: 9 TABLET | Refills: 5 | Status: SHIPPED | OUTPATIENT
Start: 2022-02-02

## 2022-02-02 NOTE — TELEPHONE ENCOUNTER
Left message on mother's voicemail that a prescription for Imitrex 100 mg was sent to the pharmacy and in addition a prescription for Phenergan suppository was sent to use if the Imitrex was not effective in stopping the headache  If headaches persist consider evaluation emergency room to rule out other etiologies in for parental treatment

## 2022-02-02 NOTE — TELEPHONE ENCOUNTER
Pt's mom ([employer]) calling to follow up  States pt still has HA  Needs to know what can be done  Best  [phone], ok to leave detailed message  Dr Rhae Fothergill - Please advise  See previous

## 2022-02-02 NOTE — TELEPHONE ENCOUNTER
Pt returning call  Expresses apologies that she missed Dr Gray Section call  Re-iterated Dr Tiffany Lozano recommendations  Verbalizes understanding  Nothing further at this time

## 2022-02-07 ENCOUNTER — APPOINTMENT (EMERGENCY)
Dept: CT IMAGING | Facility: HOSPITAL | Age: 47
End: 2022-02-07
Payer: COMMERCIAL

## 2022-02-07 ENCOUNTER — HOSPITAL ENCOUNTER (EMERGENCY)
Facility: HOSPITAL | Age: 47
Discharge: HOME | End: 2022-02-07
Attending: EMERGENCY MEDICINE | Admitting: EMERGENCY MEDICINE
Payer: COMMERCIAL

## 2022-02-07 VITALS
BODY MASS INDEX: 27.11 KG/M2 | TEMPERATURE: 98.2 F | WEIGHT: 153 LBS | HEART RATE: 107 BPM | RESPIRATION RATE: 18 BRPM | OXYGEN SATURATION: 99 % | DIASTOLIC BLOOD PRESSURE: 59 MMHG | HEIGHT: 63 IN | SYSTOLIC BLOOD PRESSURE: 126 MMHG

## 2022-02-07 DIAGNOSIS — G43.909 MIGRAINE: Primary | ICD-10-CM

## 2022-02-07 PROCEDURE — 99284 EMERGENCY DEPT VISIT MOD MDM: CPT

## 2022-02-07 PROCEDURE — 99284 EMERGENCY DEPT VISIT MOD MDM: CPT | Performed by: EMERGENCY MEDICINE

## 2022-02-07 PROCEDURE — G1004 CDSM NDSC: HCPCS

## 2022-02-07 PROCEDURE — 70450 CT HEAD/BRAIN W/O DYE: CPT

## 2022-02-07 RX ORDER — METOCLOPRAMIDE 10 MG/1
10 TABLET ORAL EVERY 6 HOURS PRN
Qty: 30 TABLET | Refills: 0 | Status: SHIPPED | OUTPATIENT
Start: 2022-02-07 | End: 2022-03-02 | Stop reason: SDUPTHER

## 2022-02-07 RX ORDER — METOCLOPRAMIDE 10 MG/1
10 TABLET ORAL ONCE
Status: COMPLETED | OUTPATIENT
Start: 2022-02-07 | End: 2022-02-07

## 2022-02-07 RX ADMIN — METOCLOPRAMIDE 10 MG: 10 TABLET ORAL at 19:12

## 2022-02-07 NOTE — ED PROVIDER NOTES
History  Chief Complaint   Patient presents with    Headache     headache x 2 wks; sent here by urgent care      HPI patient is a 47-year-old female, followed by a neurologist for migraines, currently on Imitrex amitriptyline and a suppository which she does not remember the name of  From the record it appears to be Phenergan  Followed by Dr Gleda Ahumada  Patient reports she was seen in urgent care today for intractable headache that is been going on for the last 2 weeks  Patient was referred to the emergency department for headache  Patient reports that ache is similar to migraines she has had the past just more severe  Patient reports unrelieved by her normal migraine medicines  Apparently her neurologist recently increased her sumatriptan  Patient denies any vomiting  She denies any photophobia  Past medical history of migraines chronic sees a neurologist, hearing loss  Family history noncontributory  Social history, nonsmoker no history of drug abuse    Prior to Admission Medications   Prescriptions Last Dose Informant Patient Reported? Taking? SUMAtriptan (IMITREX) 100 mg tablet   No No   Sig: One p  O  At headache onset, may repeat after 2 hours p r n  Maximum 2/24 hours   amitriptyline (ELAVIL) 50 mg tablet   No No   Sig: TAKE 1 TABLET BY MOUTH EVERY NIGHT AT BEDTIME   divalproex sodium (DEPAKOTE SPRINKLE) 125 MG capsule  Self No No   Sig: One b i d    promethazine (PHENERGAN) 25 mg suppository   No No   Sig: Insert 1 suppository (25 mg total) into the rectum every 6 (six) hours as needed for nausea or vomiting (Headache)      Facility-Administered Medications: None       Past Medical History:   Diagnosis Date    Hearing loss - left     Migraine     Seizures (HCC)        No past surgical history on file      Family History   Problem Relation Age of Onset    Cancer Mother     Arthritis Mother     Liver disease Father      I have reviewed and agree with the history as documented  E-Cigarette/Vaping    E-Cigarette Use Never User      E-Cigarette/Vaping Substances    Nicotine No     THC No     CBD No     Flavoring No     Other No     Unknown No      Social History     Tobacco Use    Smoking status: Never Smoker    Smokeless tobacco: Never Used   Vaping Use    Vaping Use: Never used   Substance Use Topics    Alcohol use: No    Drug use: No       Review of Systems   Constitutional: Negative for diaphoresis, fatigue and fever  HENT: Negative for congestion, ear pain, nosebleeds and sore throat  Eyes: Negative for photophobia, pain, discharge and visual disturbance  Respiratory: Negative for cough, choking, chest tightness, shortness of breath and wheezing  Cardiovascular: Negative for chest pain and palpitations  Gastrointestinal: Negative for abdominal distention, abdominal pain, diarrhea and vomiting  Genitourinary: Negative for dysuria, flank pain and frequency  Musculoskeletal: Negative for back pain, gait problem and joint swelling  Skin: Negative for color change and rash  Neurological: Positive for headaches  Negative for dizziness and syncope  Psychiatric/Behavioral: Negative for behavioral problems and confusion  The patient is not nervous/anxious  All other systems reviewed and are negative  Physical Exam  Physical Exam  Vitals and nursing note reviewed  Constitutional:       Appearance: She is well-developed  HENT:      Head: Normocephalic  Right Ear: External ear normal       Left Ear: External ear normal       Nose: Nose normal    Eyes:      General: Lids are normal       Extraocular Movements: Extraocular movements intact  Pupils: Pupils are equal, round, and reactive to light  Comments: Normal blood vessels flat disc   Cardiovascular:      Rate and Rhythm: Normal rate and regular rhythm  Pulses: Normal pulses  Heart sounds: Normal heart sounds     Pulmonary:      Effort: Pulmonary effort is normal  No respiratory distress  Musculoskeletal:         General: No deformity  Normal range of motion  Cervical back: Normal range of motion and neck supple  Skin:     General: Skin is warm and dry  Neurological:      General: No focal deficit present  Mental Status: She is alert and oriented to person, place, and time  Cranial Nerves: No cranial nerve deficit  Pulse oximetry 99% on room air adequate oxygenation, no hypoxia  Vital Signs  ED Triage Vitals [02/07/22 1845]   Temperature Pulse Respirations Blood Pressure SpO2   98 2 °F (36 8 °C) (!) 107 18 126/59 99 %      Temp Source Heart Rate Source Patient Position - Orthostatic VS BP Location FiO2 (%)   Oral Monitor Sitting Left arm --      Pain Score       --           Vitals:    02/07/22 1845   BP: 126/59   Pulse: (!) 107   Patient Position - Orthostatic VS: Sitting         Visual Acuity      ED Medications  Medications   metoclopramide (REGLAN) tablet 10 mg (10 mg Oral Given 2/7/22 1912)       Diagnostic Studies  Results Reviewed     None                 CT head without contrast   Final Result by Todd Carr MD (02/07 1941)      No acute intracranial abnormality  Workstation performed: [id number]                    Procedures  Procedures         ED Course                after the Reglan patient reports minimal improvement but seems quite comfortable                SBIRT 20yo+      Most Recent Value   SBIRT (24 yo +)    In order to provide better care to our patients, we are screening all of our patients for alcohol and drug use  Would it be okay to ask you these screening questions? Yes Filed at: 02/07/2022 2005   Initial Alcohol Screen: US AUDIT-C     1  How often do you have a drink containing alcohol? 0 Filed at: 02/07/2022 2005   2  How many drinks containing alcohol do you have on a typical day you are drinking? 0 Filed at: 02/07/2022 2005   3a  Male UNDER 65:  How often do you have five or more drinks on one occasion? 0 Filed at: 02/07/2022 2005   3b  FEMALE Any Age, or MALE 65+: How often do you have 4 or more drinks on one occassion? 0 Filed at: 02/07/2022 2005   Audit-C Score 0 Filed at: 02/07/2022 2005   MERRILL: How many times in the past year have you    Used an illegal drug or used a prescription medication for non-medical reasons? Never Filed at: 02/07/2022 2005                    [facility] medical decision making 37-year-old female presents emergency department, history of intractable migraines, seen in urgent care and referred to the emergency department  Headache is more severe than normal so CT was performed to rule out intracranial pathology was negative  Patient treated with Reglan  Discussed outpatient treatment with Reglan  Discussed follow-up with her neurologist   Discussed indications to return  Disposition  Final diagnoses:   Migraine     Time reflects when diagnosis was documented in both MDM as applicable and the Disposition within this note     Time User Action Codes Description Comment    2/7/2022  7:59 PM Kaushal Olsen, [address] [place] [address] [G43 [id number]] Migraine       ED Disposition     ED Disposition Condition Date/Time Comment    Discharge Stable Mon Feb 7, 2022  7:59 PM Kaelyn Hill discharge to home/self care  Follow-up Information     Follow up With Specialties Details Why Farrah Oconnor MD Neurology   [address] [place] [address]  [phone]            Patient's Medications   Discharge Prescriptions    METOCLOPRAMIDE (REGLAN) 10 MG TABLET    Take 1 tablet (10 mg total) by mouth every 6 (six) hours as needed (Headache or nausea)       Start Date: 2/7/2022  End Date: --       Order Dose: 10 mg       Quantity: 30 tablet    Refills: 0       No discharge procedures on file      PDMP Review     None          ED Provider  Electronically Signed by           Belkis Schaefer MD  02/07/22 2006

## 2022-02-08 NOTE — DISCHARGE INSTRUCTIONS
Home to rest  Reglan, Every 8 hours if needed for headache, make you sleepy  Follow up with your neurologist  Return worsening symptoms or any problems

## 2022-02-14 ENCOUNTER — TELEPHONE (OUTPATIENT)
Dept: NEUROLOGY | Facility: CLINIC | Age: 47
End: 2022-02-14

## 2022-02-14 DIAGNOSIS — G43.009 MIGRAINE WITHOUT AURA AND WITHOUT STATUS MIGRAINOSUS, NOT INTRACTABLE: ICD-10-CM

## 2022-02-14 RX ORDER — DIVALPROEX SODIUM 125 MG/1
CAPSULE, COATED PELLETS ORAL
Qty: 360 CAPSULE | Refills: 3 | Status: SHIPPED | OUTPATIENT
Start: 2022-02-14 | End: 2022-07-14 | Stop reason: SDUPTHER

## 2022-02-14 NOTE — TELEPHONE ENCOUNTER
Spoke with Paulina's mother  She will increase her Depakote to 250 milligrams twice daily and continue with the current dose of amitriptyline  She will use Imitrex as needed    Reviewed records from emergency room earlier this month

## 2022-02-14 NOTE — TELEPHONE ENCOUNTER
Pt's mom called w/ update  States imitrex and phenergan did not work at all  Pt was in the ER on 2/7/22  Was given reglan  It helped for a day or two  Current meds:   depakote 125 mg 1 tab bid   Amitriptyline 50 mg at bedtime  Sumatriptan 100 mg prn as ordered  Last time, she took sumatriptan was a week ago but not effective    States that pt's meds used to work but not anymore  Pt's migraines is not going away  Asking if dosage needs to be increased or try alt meds?             [phone] ok to leave detailed message

## 2022-02-17 NOTE — TELEPHONE ENCOUNTER
Pt's mom called and states that pt's migraines are not going away  She did increase depakote as below but no change      Please advise  [phone] to leave detailed message

## 2022-02-17 NOTE — TELEPHONE ENCOUNTER
I spoke with diabetes mother and recommended short course of steroids  She will take 60 mg of prednisone for 2 days then 40 mg for a day and 20 mg for a day and then stop    She will continue with her other current medications

## 2022-03-02 DIAGNOSIS — G43.909 MIGRAINE: ICD-10-CM

## 2022-03-02 NOTE — TELEPHONE ENCOUNTER
Pt's mom John Ward called to change pt's contact #    Pt's new cell # [phone]  # updated  Also, states that reglan was ordered inpt  This med is helping pt's migraine  Asking if you can take over prescribing this med? Rx entered   Pls review and sign off if agreeable      Ok to leave a detailed message

## 2022-03-03 RX ORDER — METOCLOPRAMIDE 10 MG/1
10 TABLET ORAL EVERY 6 HOURS PRN
Qty: 30 TABLET | Refills: 6 | Status: SHIPPED | OUTPATIENT
Start: 2022-03-03

## 2022-03-16 ENCOUNTER — TELEPHONE (OUTPATIENT)
Dept: NEUROLOGY | Facility: CLINIC | Age: 47
End: 2022-03-16

## 2022-03-16 NOTE — TELEPHONE ENCOUNTER
Pt's mom (John Ward) calling to inform that pt is still getting HA's even after increase of Depakote in February  Mom states that HA's get better but they don't go away  Asked if pt has taken her Imitrex at onset of HA  Pt states no  Advised pt to take Imitrex as prescribed  Reviewed instructions  Verbalizes understanding  Mom to call back with update

## 2022-03-21 NOTE — TELEPHONE ENCOUNTER
A prescription for Maxalt has been sent to her pharmacy  Please put her on a move up list to be seen sometime soon    Thank you

## 2022-03-21 NOTE — TELEPHONE ENCOUNTER
Pt's mom called and states that pt tried sumatriptan but it does not seem to be helping  Pt took sumatriptan on Thursday and this past weekend  It has not been helping  HA still not going away     Requesting alt med be sent to [address] on file             [phone] ok to leave detailed message

## 2022-03-22 NOTE — TELEPHONE ENCOUNTER
demetrius    Placed on cancellation list    Spoke w/ pt's mom Marylin and advised of the below and above  States that she picked up the [address] yesterday  Pt took 1 tab yesterday  HA did went away but now it feels that LIR[employer] is coming back  She will take 1 tab today  Educated the pt's mom Bindu Led that we do not recommend she take any triptan more than 3 days in any 1 week due to medication over use headache and CVA risk with overuse  She verbalized understanding   She will relay this med to her dtr

## 2022-03-24 NOTE — TELEPHONE ENCOUNTER
Pt's mom called w/ update  States pt's current migraines meds are not helping  It does not get rid of her migraines  Her migraines gets worse at night  It does not even take the edge off  Current meds  Depakote 125 mg 2 tabs bid  Rizatriptan 10 mg prn as ordered  Amitriptyline 50 mg at bedtime    Requesting alt meds be sent to [address]

## 2022-03-24 NOTE — TELEPHONE ENCOUNTER
I spoke with Paulina's mom and have recommended initiating Aimovig starting at 140 mg and indicated that he has to be injected under the skin once a month  She will continue her other medications currently and hopes to decrease them over time

## 2022-03-25 ENCOUNTER — TELEPHONE (OUTPATIENT)
Dept: NEUROLOGY | Facility: CLINIC | Age: 47
End: 2022-03-25

## 2022-03-25 NOTE — TELEPHONE ENCOUNTER
pt's mom called and states that aimovig needs PA      She would like a call with [phone] to leave detailed message

## 2022-03-25 NOTE — TELEPHONE ENCOUNTER
aimovig PA completed on CMM  Key: [id number]  Urgent request     If [employer] has not responded in 1-3 days or if you have any questions about your ePA request, please contact [place] [employer] at [phone]

## 2022-03-25 NOTE — TELEPHONE ENCOUNTER
Approved per Dauna Dubin approved from 1/1/22-6/23/22    Left message for pharm making them aware of approval    Pt's mom made aware of approval

## 2022-04-26 ENCOUNTER — TELEPHONE (OUTPATIENT)
Dept: NEUROLOGY | Facility: CLINIC | Age: 47
End: 2022-04-26

## 2022-04-26 NOTE — TELEPHONE ENCOUNTER
Pt's mom Melissa Torre called and states that pt received defected aimovig soaj  She called the  at [phone]  She was told that the  will ship the replacement to our office  Advised Ada that replacement needs to be sent to pt's home, not the office  She verbalized understanding  States that this is what  told her  Called  at  [phone], spoke w/Alicia and advised of the above  States that replacement is being processed and it will be shipped to pt's home address on file     Pt's mom Melissa Torre made aware         [phone] ok to leave a detailed message

## 2022-04-28 NOTE — TELEPHONE ENCOUNTER
rhina from [address] [place] [address] called asking for a verbal for aimovig,  pt received a defective pen       Then spoke to Chiara Grider, pharmacist and gave   Verbal order given for replacement

## 2022-05-02 DIAGNOSIS — G43.009 MIGRAINE WITHOUT AURA AND WITHOUT STATUS MIGRAINOSUS, NOT INTRACTABLE: ICD-10-CM

## 2022-05-02 RX ORDER — AMITRIPTYLINE HYDROCHLORIDE 50 MG/1
TABLET, FILM COATED ORAL
Qty: 30 TABLET | Refills: 5 | Status: SHIPPED | OUTPATIENT
Start: 2022-05-02

## 2022-06-17 ENCOUNTER — TELEPHONE (OUTPATIENT)
Dept: NEUROLOGY | Facility: CLINIC | Age: 47
End: 2022-06-17

## 2022-06-17 NOTE — TELEPHONE ENCOUNTER
Mg Segovia,     Pt's mother has called back and confirmed appointment for 6/24/2022 at 2:20 pm     Thank you,     Leeanne Potter

## 2022-06-24 ENCOUNTER — OFFICE VISIT (OUTPATIENT)
Dept: NEUROLOGY | Facility: CLINIC | Age: 47
End: 2022-06-24
Payer: COMMERCIAL

## 2022-06-24 VITALS
SYSTOLIC BLOOD PRESSURE: 110 MMHG | BODY MASS INDEX: 26.72 KG/M2 | WEIGHT: 150.8 LBS | OXYGEN SATURATION: 99 % | HEART RATE: 97 BPM | TEMPERATURE: 98.7 F | DIASTOLIC BLOOD PRESSURE: 82 MMHG | HEIGHT: 63 IN

## 2022-06-24 DIAGNOSIS — G43.009 MIGRAINE WITHOUT AURA AND WITHOUT STATUS MIGRAINOSUS, NOT INTRACTABLE: Primary | ICD-10-CM

## 2022-06-24 PROCEDURE — 99214 OFFICE O/P EST MOD 30 MIN: CPT | Performed by: PSYCHIATRY & NEUROLOGY

## 2022-06-24 NOTE — PROGRESS NOTES
Emelyn Johnson is a 42 y o  female follow-up today with history of migraine headache    Assessment:  1  Migraine without aura and without status migrainosus, not intractable        Plan:  Continue Aimovig  Continue amitriptyline   Taper Depakote  Reglan her Maxalt as needed   Follow-up 6 months    Discussion:  Carola Ramirez reports that she was having frequent headaches until Aimovig was initiated and since that time her headache frequency is improved considerably as has the intensity  She uses Reglan if she has a mild headache with nausea or uses Maxalt as needed  She remains on nortriptyline have recommended tapering off of Depakote by 125 mg every 5 days  If she has problems she will notify me otherwise I will see her back in 6 months      Subjective:    HPI  Carola Ramirez returns in follow-up today accompanied by her mother  She reports that since here last she was having a lot of headaches  She had been in the emergency room a few times  Medications were adjusted however without success  Eventually Salli Jolly was added since that time her headache frequency has improved considerably  She states she occasionally gets a mild headache with nausea and she finds that Reglan is effective in stopping the headache for stronger headache she uses Maxalt but has not had to use this very much at all  She notes no adverse effect with the him a vague  Denies any other health changes  Past Medical History:   Diagnosis Date    Hearing loss - left     Migraine     Seizures (HCC)        Family History:  Family History   Problem Relation Age of Onset    Cancer Mother     Arthritis Mother     Liver disease Father        Past Surgical History:  History reviewed  No pertinent surgical history  Social History:   reports that she has never smoked  She has never used smokeless tobacco  She reports that she does not drink alcohol and does not use drugs  Allergies:  Patient has no known allergies        Current Outpatient Medications:     amitriptyline (ELAVIL) 50 mg tablet, TAKE 1 TABLET BY MOUTH EVERY NIGHT AT BEDTIME, Disp: 30 tablet, Rfl: 5    divalproex sodium (DEPAKOTE SPRINKLE) 125 MG capsule, two b i d , Disp: 360 capsule, Rfl: 3    Erenumab-aooe (Aimovig) 140 MG/ML SOAJ, Inject subcutaneously monthly, Disp: 1 mL, Rfl: 5    metoclopramide (REGLAN) 10 mg tablet, Take 1 tablet (10 mg total) by mouth every 6 (six) hours as needed (Headache or nausea), Disp: 30 tablet, Rfl: 6    predniSONE 20 mg tablet, Three p o  for 2 days then 2 p o  for 1 day then 1 p o  for 1 day then stop, Disp: 9 tablet, Rfl: 0    promethazine (PHENERGAN) 25 mg suppository, Insert 1 suppository (25 mg total) into the rectum every 6 (six) hours as needed for nausea or vomiting (Headache), Disp: 4 each, Rfl: 5    rizatriptan (MAXALT) 10 MG tablet, One p o  At headache onset, may repeat 1 after 2 hours p r n  Maximum 2/24 hours, Disp: 9 tablet, Rfl: 5    SUMAtriptan (IMITREX) 100 mg tablet, One p  O  At headache onset, may repeat after 2 hours p r n  Maximum 2/24 hours, Disp: 9 tablet, Rfl: 5    I have reviewed the past medical, social and family history, current medications, allergies, vitals, review of systems and updated this information as appropriate today     Objective:    Vitals:  Blood pressure 110/82, pulse 97, temperature 98 7 °F (37 1 °C), temperature source Tympanic, height 5' 3" (1 6 m), weight 68 4 kg (150 lb 12 8 oz), SpO2 99 %  Physical Exam    Neurological Exam  GENERAL:  Well-developed well-nourished woman in no acute distress  HEENT/NECK: Head is atraumatic normocephalic, neck is supple  NEUROLOGIC:  Mental Status: Awake and alert without aphasia  Cranial Nerves: Extraocular movements are full  Face is symmetrical  Coordination:  Gait is stable            ROS:    Review of Systems   Constitutional: Negative  Negative for appetite change and fever  HENT: Negative    Negative for hearing loss, tinnitus, trouble swallowing and voice change  Eyes: Negative  Negative for photophobia and pain  Respiratory: Negative  Negative for shortness of breath  Cardiovascular: Negative  Negative for palpitations  Gastrointestinal: Negative  Negative for nausea and vomiting  Endocrine: Negative  Negative for cold intolerance  Genitourinary: Negative  Negative for dysuria, frequency and urgency  Musculoskeletal: Negative  Negative for myalgias and neck pain  Skin: Negative  Negative for rash  Neurological: Positive for headaches  Negative for dizziness, tremors, seizures, syncope, facial asymmetry, speech difficulty, weakness, light-headedness and numbness  Hematological: Negative  Does not bruise/bleed easily  Psychiatric/Behavioral: Negative  Negative for confusion, hallucinations and sleep disturbance

## 2022-07-01 ENCOUNTER — TELEPHONE (OUTPATIENT)
Dept: NEUROLOGY | Facility: CLINIC | Age: 47
End: 2022-07-01

## 2022-07-01 NOTE — TELEPHONE ENCOUNTER
Patient's mother calling in stating Aimovig requires PA  Patient is due for medication on 7/4    Submitted PA on CMM, awaiting determination   Submitted as urgent since patient is due for medication soon    Key: [id number]    Please update patient's mother with determination

## 2022-07-05 NOTE — TELEPHONE ENCOUNTER
Susan Garner approved from 01/01/2022 - 12/31/2022    Called patient's mother, continuous ring   Unable to leave vm as vm box has not been set up

## 2022-07-13 NOTE — TELEPHONE ENCOUNTER
Second attempt to reach patient's mother   Unable to leave vm     Sent [employer] message making them aware of approval

## 2022-07-14 DIAGNOSIS — G43.009 MIGRAINE WITHOUT AURA AND WITHOUT STATUS MIGRAINOSUS, NOT INTRACTABLE: ICD-10-CM

## 2022-07-14 NOTE — TELEPHONE ENCOUNTER
Pt called and states that pt was weaned off depakote bec she started Aimovig  Last dose of depakote was 2 weeks ago  At that time, she was taking 500 mg bid  States that [place] is helping but does not completely take her migraines away  Pt would like to restart depakote 125 mg sprinkles1 tab bid  Requesting script be sent to [address]  Rx entered   Pls review and sign off    thanks

## 2022-07-15 RX ORDER — DIVALPROEX SODIUM 125 MG/1
CAPSULE, COATED PELLETS ORAL
Qty: 180 CAPSULE | Refills: 3 | Status: SHIPPED | OUTPATIENT
Start: 2022-07-15

## 2022-09-07 ENCOUNTER — TELEPHONE (OUTPATIENT)
Dept: NEUROLOGY | Facility: CLINIC | Age: 47
End: 2022-09-07

## 2022-09-07 NOTE — TELEPHONE ENCOUNTER
Pt's mom Marylin LVMICHELLE regarding pt's medication  Requesting   [phone]  Called pt's mom and LVM for call back and advised her to leave a detailed message of her concerns

## 2022-09-08 NOTE — TELEPHONE ENCOUNTER
Called pt's mom Marylin  She states that pt is getting the shot every month (Liane Gage) but currently not able to get it due to expense  She says it was originally costing them $47 but now cost has increased to $221  Liane Gage was due 9/7  Pt's mom states that Aimovig very effective but they can no longer afford it  Asking for an alternative  Dr Jennifer Ivey - Please advise  MSW - Can you look into copay assistance programs for this pt? The medication has been very effective so it would be in pt's best interest to remain on it  Thank you!

## 2022-09-08 NOTE — TELEPHONE ENCOUNTER
MSW phoned patient's insurance at [phone] and spoke with Lulú Major to inquire if a tier exception is an option to lower cost  Lulú Major stated that "a tier exception cannot be attempted since patient is in the donut hole - they must pay 25% of the cost of the medication"  Reference # is [id number]  MSW phoned number listed for patient ([phone]) and spoke with patient's mother, Morgan Salguero  MSW offered the option of applying for the Aimovig PAP from the [employer]  MSW explained that patient would need to meet income/asset guidelines and complete an application, but if approved, patient would be able to receive the medication free of charge from the drug   Patient's mother was agreeable to receive the application for Aimovig PAP, but inquired if there was any other medication that Dr Rocio Gardner could prescribe so she doesn't "need to go through all of this"  MSW read Dr Peyton Bach response that all medications in this class will be expensive, so patient's mother is willing to review the application and then will decide if they will apply for the PAP  MSW emailed the Aimovig PAP dave to patient's mother at [email or web address] along with the following message:    "Hi Ada,    Attached is the application for the [address] [place] [address] Patient Assistance Program that is offered by the [employer]  Please review the application and highlighted eligibility guidelines  Please complete the application if you wish to apply  If Tammy Gomez would be approved for this program, she would receive the medication free of charge from the   I will follow-up with you on 9/9 to see if you plan to apply  Please feel free to reach out if you have any questions in the interim  Best Regards,    Dominga Do MSW   [facility]  [facility], [address] [facility]  [phone] - phone  [phone] - fax   Mo Brett  [facility]"    MSW will follow-up with patient's mother on 9/9 to see if they would like to proceed

## 2022-09-08 NOTE — TELEPHONE ENCOUNTER
Unfortunately all the medications in this class or expensive    If MSW not able to be helpful she can check with her insurance to see if there is a lower price on 1 of the alternative medications in the same class

## 2022-09-09 NOTE — TELEPHONE ENCOUNTER
MSW phoned patient's mother, Cathy Mccann, this date  They are interested in applying for the Aimovig PAP  Cathy Mccann stated that she will complete the application this weekend and will drop off same the [place] neurology office on 9/12  MSW left a message on the [facility] that application will be dropped off to the [place] location and should be forwarded to this writer  MSW will initiate provider section of the Aimovig PAP application and will get same to Dr Clayton Ann for completion/signature

## 2022-09-12 NOTE — TELEPHONE ENCOUNTER
MSW initiated the provider section of the Aimovig PAP application  MSW forwarded this form to Glenn Lorenzo, who will print/give application to Dr Fritz Wall on 9/13  MSW attempted to reach patient's mother to confirm that she will be dropping off the patient section of the application this date  No answer at [phone]  MSW left a message requesting callback  MSW spoke with Teodoro Almaguer, , at the [place] office - she was made aware that patient's mother will be dropping of the application and will forward same to this writer

## 2022-09-13 NOTE — TELEPHONE ENCOUNTER
MSW received patient and provider section of the Aimovig PAP dave back  Patient's application was missing answers to #6  MSW phoned patient's mother who verbally provided answers  MSW answered questions on the application  MSW will also clarify the dosing with Dr Juaquin Garcia as the dosing section was left blank on the provider form

## 2022-09-13 NOTE — TELEPHONE ENCOUNTER
MSW faxed the completed Aimovig PAP application to the [address] [place] [address],[address] this date at [phone]  Successful fax notice received  MSW will follow-up with [employer] on 9/14 to confirm receipt of the application and to inquire about processing times

## 2022-09-14 NOTE — TELEPHONE ENCOUNTER
MSW phoned the [facility] at [phone] and spoke with Dayanna Kramer - she stated that the application is not yet in their system but that it can take 24-48 hours to be received  Dayanna Kramer suggested this writer callback on 9/15  MSW attempted to reach patient's mother, Isacc Ryan, to make her aware of above at [phone]  No answer and no way to leave a message as the voicemail box has not been set up yet

## 2022-09-15 NOTE — TELEPHONE ENCOUNTER
GIOVANNI phoned the [facility] at [phone] and spoke with [address]  He was able to locate the application in their fax  [address] noticed that he prior authorization letter was missing  Gianni asked that same be faxed to them at [phone]  [address] suggested that this writer call back later this date to see if they received same and if so, for it to be attached to the chart for processing  [address] stated that once all documents are received that it can take them up to 7 business days to determine eligibility

## 2022-09-15 NOTE — TELEPHONE ENCOUNTER
MSW called back to the [facility] at [phone] and spoke with Mary Morris  She was able to locate the prior auth letter and attached it to patient's file  Mary Morris then put the application in a "ready for processing" queue  Mary Morris stated that the application should take 24-48 hours to process  MSW will follow-up on 9/16 to see if there is a determination  MSW phoned patient's wife to provide above update

## 2022-09-16 NOTE — TELEPHONE ENCOUNTER
MSW called back to the [facility] at [phone] and spoke with [employer]  She stated that due to patient's reported income that they are requesting proof of income, as they feel that patient may be eligible for the [address] through [employer]  [facility] stated that patient can either:    1) fax proof of income to the [employer] at [phone] (F1'B preferred)  2) apply for Extra Help by going online to [employer] or calling [phone]    MSW phoned patient's mother at [phone]  Patient's mother stated that she received a call from the [employer] yesterday and they asked her to provide proof of income for patient  Patient's mother stated that she did gather those documents and is currently on the way to [employer] to fax patient's proof of income to [facility]  MSW did  patient's mother that even if the [employer] reviews patient's income documents, that they may still encourage patient to apply for the [address]  Patient's mother wishes to still just send the [facility] the requested information and wait for their outcome  MSW advised that this writer will follow-up with the [facility] on 9/20 and will update her as able  Patient's mother was in agreement with this plan

## 2022-09-20 NOTE — TELEPHONE ENCOUNTER
MSW phoned the 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

## 2022-09-21 NOTE — TELEPHONE ENCOUNTER
MSW phoned the [employer] this date at [phone] to check on the status of patient's application, spoke with Leamon Phlegm  She stated that they only received patient's proof of income, and will need same for patient's mother as she is part of the household  Leamon Phlegm stated that patient's mother's financial documents should be faxed to [phone]  MSW phoned patient's mother at [phone] to make her aware of above  Patient's mother stated that she will fax her proof of income later this date  Patient's mother requested this writer to email her the fax number  MSW sent the following message to [email or web address]:    "Hi Ada,     The fax number for the [employer] is [phone]  Please fax your proof of income to them ASAP  I will call the [employer] to follow-up tomorrow/Friday  Best Regards,    Yolis Denise MSW   Fatuma Green [facility]  [facility], [address] [facility]  [phone] - phone  [phone] - fax   Gaurav Engana Ptyon  [employer]"    MSW will follow-up with the [employer] later this week to check status of application

## 2022-09-22 NOTE — TELEPHONE ENCOUNTER
[facility] at [phone] and spoke with Catia Zhou  She confirmed that they did receive patient's mother's proof of income in the afternoon yesterday  Catia Zhou stated that patient's application is in the "ready for processing" stage, and that a decision should be rendered within 24-48 hours  Catia Zhou stated that patient will be notified of outcome, and that our office will receive a faxed determination  MSW will call back on 9/23/22 in the PM to check the status of the application  MSW phoned patient's mother to make her aware of above  MSW will update patient's mother as able after speaking with the [employer] on 9/23

## 2022-09-23 NOTE — TELEPHONE ENCOUNTER
MSW phoned the [employer] this date at [phone] and spoke with Katrin Mathews  She stated that patient was approved as of yesterday through 12/31/22  Katrin Mathews stated that they scheduled delivery with patient's mother, John Ward, for Tuesday 9/27  Ktarin Mathews stated that they will be accepting applications for 7770 after 00/7 and that applications should be obtained from www  [email or web address]  [email or web address] and selecting Wayne Vazquez by name to ensure that the correct application is completed  MSW phoned patient's mother at [phone]  Patient's mother was aware of approval and confirmed that the meds are to be delivered on Tuesday 9/27  Patient's mother stated that she was told that she can call for a refill on 12/5/22 and that she should reapply after 12/22/22  MSW will follow-up with patient's mother on 9/28 to ensure that medication was delivered  MSW will then assist with refill/reapplying in December

## 2022-09-28 NOTE — TELEPHONE ENCOUNTER
MSW phoned patient's mother who confirmed that they did receive the Aimovig delivery from the PAP yesterday (received 3 pens)  Patient's mother will call for refills on 12/5/22 and will contact this writer if she needs assistance with same  MSW will contact patient's mother towards the end of December to reapply for the Aimovig PAP for 2023 as they will not have their updated [place] [address] Letters until then  While on the phone patient's mother inquired if they could get Depakote sprinkles from the [employer]  MSW stated that the PAPs are offered by the [facility] for select medications (usually ones that are newer/brand name only)  MSW will look into if Depakote sprinkles can be obtained from a PAP  MSW will update patient's mother as able  Patient's mother stated that she is currently getting the medication from either [facility] or Single Card discount card and that they are paying $29 per month for the medication (not going through patient's insurance as it would be more)  Patient's mother stated that there is no rush as they have a current supply of this medication

## 2022-10-03 ENCOUNTER — TELEPHONE (OUTPATIENT)
Dept: NEUROLOGY | Facility: CLINIC | Age: 47
End: 2022-10-03

## 2022-10-03 NOTE — TELEPHONE ENCOUNTER
Patient's mother is inquiring about better affordability for Depakote Sprinkles  Patient's mother stated that she is currently going through [facility] and pays $29 per month  Patient's mother stated that the medication would be more expensive if they would go through patient's insurance  MSW phoned [employer] [facility] at [phone] and spoke with Nadia - she was not able to advise on if Depakote Sprinkles are covered as a formulation of Depakote for their PAP  Nadia transferred this writer's call to [facility] in [facility]" but he could not answer this writer's question either  MSW will try to reach [address] [place] [address] at a later time to see if another representative could advise if Depakote Sprinkles is a covered formulation on their PAP

## 2022-10-03 NOTE — TELEPHONE ENCOUNTER
MSW called back to [facility] and spoke with Janette Summers - she stated that the only Depakote they offer from their PAP is the tablets, not the capsules  MSW looked into other resources with the following results:    - www mat org - only list Depakote ER   - Rx Sheralyn Irons - only list Depakote DR and ER  - [facility] - no program  - [facility] - no program  - [facility] - Depakote capsule 125mg BID - cost is $42 96 for a 90 day supply    MSW will call patient's insurance to inquire about pricing through insurance

## 2022-10-04 NOTE — TELEPHONE ENCOUNTER
MSW phoned patient's prescription insurance at [phone] and spoke with Irena  She stated that Depakote Sprinkles are considered non-formulary, but the plan does cover Divalproex sodium sprinkles (which is the generic)  Magalie provided pricing for same as $24 98 for a one month supply (60 capsules) or $74 13 for a three month supply (90 capsules)  Irena stated that no tier exception is possible to lower the cost, but if patient has a hard time affording the medication, that she can contact t prescription plan at [phone] to inquire about financial assistance  MSW requested to take down the information about financial assistance for the patient, but Irena stated that the patient would need to call in herself to request information  Reference # for call is [id number]  MSW phoned patient's mother to make her aware of above  She will contact patient's prescription coverage to inquire about the financial assistance they offer  MSW will follow-up with patient's mother on 10/7 to inquire about outcome of call  Patient's mother was in agreement with this plan

## 2022-10-07 NOTE — TELEPHONE ENCOUNTER
MSW received a callback from patient's mother, Toshia Alonso  She reported that she did call the insurance, but they were not able to offer anything that would have helped to lower the cost of the Depakote sprinkles or generic equivalent  Toshiajae Alonso stated that she was quoted a price of $44 for the medication through the insurance, which is higher than what she is currently paying  Toshiajea Alonso stated that she will continue to get the medication from [facility] as it only costs $23 or $26 per month  MSW will be available to patient/mother as needed

## 2022-10-07 NOTE — TELEPHONE ENCOUNTER
MSW attempted to reach patient's mother, [address], at [phone] to follow-up about her call to patient's prescription drug plan to inquire about financial assistance  No answer  MSPERCY left a message requesting callback  Awaiting same

## 2022-11-07 DIAGNOSIS — G43.009 MIGRAINE WITHOUT AURA AND WITHOUT STATUS MIGRAINOSUS, NOT INTRACTABLE: ICD-10-CM

## 2022-11-07 RX ORDER — AMITRIPTYLINE HYDROCHLORIDE 50 MG/1
TABLET, FILM COATED ORAL
Qty: 30 TABLET | Refills: 5 | Status: SHIPPED | OUTPATIENT
Start: 2022-11-07

## 2022-12-19 ENCOUNTER — OFFICE VISIT (OUTPATIENT)
Dept: NEUROLOGY | Facility: CLINIC | Age: 47
End: 2022-12-19

## 2022-12-19 VITALS
HEART RATE: 91 BPM | DIASTOLIC BLOOD PRESSURE: 68 MMHG | BODY MASS INDEX: 24.8 KG/M2 | SYSTOLIC BLOOD PRESSURE: 122 MMHG | WEIGHT: 140 LBS

## 2022-12-19 DIAGNOSIS — G43.009 MIGRAINE WITHOUT AURA AND WITHOUT STATUS MIGRAINOSUS, NOT INTRACTABLE: Primary | ICD-10-CM

## 2022-12-19 NOTE — PROGRESS NOTES
Scarlet Hernandez is a 41 y o  female returns in follow-up with history of migraine headache    Assessment:  1  Migraine without aura and without status migrainosus, not intractable        Plan:  Continue Aimovig amitriptyline and Depakote  Maxalt as needed  Follow-up 6 months    Discussion:  Raman Rodriguez reports her migraine headaches are under much better control with current management  She is resistant to decreasing some of her oral prophylactic medications  We will continue current management using Maxalt as needed and I will see her back in 6 months      Subjective:    HPI  Raman Rodriguez returns in follow-up today accompanied by her mother  She reports that since her last migraine headaches remain under very good control  She states that she needs to take medication for headache once or twice a month  She notes no adverse effects from the [address]  She remains on Depakote and amitriptyline is reluctant to make any modifications in this  She denies any new health issues      Past Medical History:   Diagnosis Date   • Hearing loss - left    • Migraine    • Seizures ([address] )        Family History:  Family History   Problem Relation Age of Onset   • Cancer Mother    • Arthritis Mother    • Liver disease Father        Past Surgical History:  History reviewed  No pertinent surgical history  Social History:   reports that she has never smoked  She has never used smokeless tobacco  She reports that she does not drink alcohol and does not use drugs  Allergies:  Patient has no known allergies        Current Outpatient Medications:   •  amitriptyline (ELAVIL) 50 mg tablet, TAKE 1 TABLET BY MOUTH EVERY NIGHT AT BEDTIME, Disp: 30 tablet, Rfl: 5  •  divalproex sodium (DEPAKOTE SPRINKLE) 125 MG capsule, Take 1 cap by mouth twice a day, Disp: 180 capsule, Rfl: 3  •  Erenumab-aooe (Aimovig) 140 MG/ML SOAJ, Inject subcutaneously monthly, Disp: 1 mL, Rfl: 5  •  metoclopramide (REGLAN) 10 mg tablet, Take 1 tablet (10 mg total) by mouth every 6 (six) hours as needed (Headache or nausea), Disp: 30 tablet, Rfl: 6  •  rizatriptan (MAXALT) 10 MG tablet, One p o  At headache onset, may repeat 1 after 2 hours p r n  Maximum 2/24 hours, Disp: 9 tablet, Rfl: 5  •  predniSONE 20 mg tablet, Three p o  for 2 days then 2 p o  for 1 day then 1 p o  for 1 day then stop (Patient not taking: Reported on 9/8/2022), Disp: 9 tablet, Rfl: 0  •  promethazine (PHENERGAN) 25 mg suppository, Insert 1 suppository (25 mg total) into the rectum every 6 (six) hours as needed for nausea or vomiting (Headache) (Patient not taking: Reported on 9/8/2022), Disp: 4 each, Rfl: 5  •  SUMAtriptan (IMITREX) 100 mg tablet, One p  O  At headache onset, may repeat after 2 hours p r n  Maximum 2/24 hours (Patient not taking: Reported on 9/8/2022), Disp: 9 tablet, Rfl: 5    I have reviewed the past medical, social and family history, current medications, allergies, vitals, review of systems and updated this information as appropriate today     Objective:    Vitals: There were no vitals taken for this visit  Physical Exam    Neurological Exam  GENERAL: Well-developed well-nourished woman in no acute distress  HEENT/NECK: Head is atraumatic normocephalic, neck is supple  NEUROLOGIC:  Mental Status: Awake and alert without aphasia  Cranial Nerves: Extraocular movements are full  Face is symmetrical  Coordination: Gait is stable      ROS:    Review of Systems   Constitutional: Negative  Negative for appetite change and fever  HENT: Negative  Negative for hearing loss, tinnitus, trouble swallowing and voice change  Eyes: Negative  Negative for photophobia, pain and visual disturbance  Respiratory: Negative  Negative for shortness of breath  Cardiovascular: Negative  Negative for palpitations  Gastrointestinal: Negative  Negative for nausea and vomiting  Endocrine: Negative  Negative for cold intolerance  Genitourinary: Negative    Negative for dysuria, frequency and urgency  Musculoskeletal: Negative  Negative for gait problem, myalgias and neck pain  Skin: Negative  Negative for rash  Allergic/Immunologic: Negative  Neurological: Positive for headaches (couple times since injection (11/27))  Negative for dizziness, tremors, seizures, syncope, facial asymmetry, speech difficulty, weakness, light-headedness and numbness  Hematological: Negative  Does not bruise/bleed easily  Psychiatric/Behavioral: Negative  Negative for confusion, hallucinations and sleep disturbance

## 2023-01-11 ENCOUNTER — APPOINTMENT (OUTPATIENT)
Dept: LAB | Facility: HOSPITAL | Age: 48
End: 2023-01-11

## 2023-01-11 DIAGNOSIS — N92.1 MENORRHAGIA WITH IRREGULAR CYCLE: ICD-10-CM

## 2023-01-11 DIAGNOSIS — G43.009 MIGRAINE WITHOUT AURA AND WITHOUT STATUS MIGRAINOSUS, NOT INTRACTABLE: ICD-10-CM

## 2023-01-11 LAB
ALBUMIN SERPL BCP-MCNC: 3.6 G/DL (ref 3.5–5)
ALP SERPL-CCNC: 47 U/L (ref 46–116)
ALT SERPL W P-5'-P-CCNC: 15 U/L (ref 12–78)
ANION GAP SERPL CALCULATED.3IONS-SCNC: 7 MMOL/L (ref 4–13)
AST SERPL W P-5'-P-CCNC: 11 U/L (ref 5–45)
BASOPHILS # BLD AUTO: 0.03 THOUSANDS/ÂΜL (ref 0–0.1)
BASOPHILS NFR BLD AUTO: 1 % (ref 0–1)
BILIRUB SERPL-MCNC: 0.29 MG/DL (ref 0.2–1)
BUN SERPL-MCNC: 12 MG/DL (ref 5–25)
CALCIUM SERPL-MCNC: 8.7 MG/DL (ref 8.3–10.1)
CHLORIDE SERPL-SCNC: 104 MMOL/L (ref 96–108)
CHOLEST SERPL-MCNC: 178 MG/DL
CO2 SERPL-SCNC: 27 MMOL/L (ref 21–32)
CREAT SERPL-MCNC: 0.58 MG/DL (ref 0.6–1.3)
EOSINOPHIL # BLD AUTO: 0.04 THOUSAND/ÂΜL (ref 0–0.61)
EOSINOPHIL NFR BLD AUTO: 1 % (ref 0–6)
ERYTHROCYTE [DISTWIDTH] IN BLOOD BY AUTOMATED COUNT: 17.2 % (ref 11.6–15.1)
GFR SERPL CREATININE-BSD FRML MDRD: 111 ML/MIN/1.73SQ M
GLUCOSE P FAST SERPL-MCNC: 85 MG/DL (ref 65–99)
HCT VFR BLD AUTO: 31.2 % (ref 34.8–46.1)
HDLC SERPL-MCNC: 80 MG/DL
HGB BLD-MCNC: 9.3 G/DL (ref 11.5–15.4)
IMM GRANULOCYTES # BLD AUTO: 0.01 THOUSAND/UL (ref 0–0.2)
IMM GRANULOCYTES NFR BLD AUTO: 0 % (ref 0–2)
LDLC SERPL CALC-MCNC: 85 MG/DL (ref 0–100)
LYMPHOCYTES # BLD AUTO: 1.28 THOUSANDS/ÂΜL (ref 0.6–4.47)
LYMPHOCYTES NFR BLD AUTO: 27 % (ref 14–44)
MCH RBC QN AUTO: 23.9 PG (ref 26.8–34.3)
MCHC RBC AUTO-ENTMCNC: 29.8 G/DL (ref 31.4–37.4)
MCV RBC AUTO: 80 FL (ref 82–98)
MONOCYTES # BLD AUTO: 0.25 THOUSAND/ÂΜL (ref 0.17–1.22)
MONOCYTES NFR BLD AUTO: 5 % (ref 4–12)
NEUTROPHILS # BLD AUTO: 3.18 THOUSANDS/ÂΜL (ref 1.85–7.62)
NEUTS SEG NFR BLD AUTO: 66 % (ref 43–75)
NONHDLC SERPL-MCNC: 98 MG/DL
NRBC BLD AUTO-RTO: 0 /100 WBCS
PLATELET # BLD AUTO: 184 THOUSANDS/UL (ref 149–390)
PMV BLD AUTO: 8.8 FL (ref 8.9–12.7)
POTASSIUM SERPL-SCNC: 4.1 MMOL/L (ref 3.5–5.3)
PROT SERPL-MCNC: 7 G/DL (ref 6.4–8.4)
RBC # BLD AUTO: 3.89 MILLION/UL (ref 3.81–5.12)
SODIUM SERPL-SCNC: 138 MMOL/L (ref 135–147)
TRIGL SERPL-MCNC: 65 MG/DL
TSH SERPL DL<=0.05 MIU/L-ACNC: 0.7 UIU/ML (ref 0.45–4.5)
WBC # BLD AUTO: 4.79 THOUSAND/UL (ref 4.31–10.16)

## 2023-02-08 ENCOUNTER — APPOINTMENT (EMERGENCY)
Dept: RADIOLOGY | Facility: HOSPITAL | Age: 48
End: 2023-02-08

## 2023-02-08 ENCOUNTER — APPOINTMENT (EMERGENCY)
Dept: CT IMAGING | Facility: HOSPITAL | Age: 48
End: 2023-02-08

## 2023-02-08 ENCOUNTER — HOSPITAL ENCOUNTER (EMERGENCY)
Facility: HOSPITAL | Age: 48
Discharge: HOME | End: 2023-02-08
Attending: EMERGENCY MEDICINE

## 2023-02-08 VITALS
HEART RATE: 95 BPM | TEMPERATURE: 97 F | SYSTOLIC BLOOD PRESSURE: 121 MMHG | OXYGEN SATURATION: 100 % | RESPIRATION RATE: 18 BRPM | DIASTOLIC BLOOD PRESSURE: 58 MMHG

## 2023-02-08 DIAGNOSIS — M54.12 CERVICAL RADICULOPATHY: Primary | ICD-10-CM

## 2023-02-08 DIAGNOSIS — M25.511 CHRONIC RIGHT SHOULDER PAIN: ICD-10-CM

## 2023-02-08 DIAGNOSIS — G89.29 CHRONIC RIGHT SHOULDER PAIN: ICD-10-CM

## 2023-02-08 RX ORDER — KETOROLAC TROMETHAMINE 30 MG/ML
15 INJECTION, SOLUTION INTRAMUSCULAR; INTRAVENOUS ONCE
Status: COMPLETED | OUTPATIENT
Start: 2023-02-08 | End: 2023-02-08

## 2023-02-08 RX ORDER — LIDOCAINE 50 MG/G
1 PATCH TOPICAL ONCE
Status: DISCONTINUED | OUTPATIENT
Start: 2023-02-08 | End: 2023-02-08 | Stop reason: HOSPADM

## 2023-02-08 RX ORDER — METHYLPREDNISOLONE 4 MG/1
TABLET ORAL
Qty: 21 TABLET | Refills: 0 | Status: SHIPPED | OUTPATIENT
Start: 2023-02-08

## 2023-02-08 RX ADMIN — KETOROLAC TROMETHAMINE 15 MG: 30 INJECTION, SOLUTION INTRAMUSCULAR at 18:08

## 2023-02-08 RX ADMIN — LIDOCAINE 1 PATCH: 50 PATCH TOPICAL at 18:08

## 2023-02-08 NOTE — ED PROVIDER NOTES
Pt Name: Stacey Wallace  MRN: [id number]  [place] 1975  Age/Sex: 41 y o  female  Date of evaluation: 2/8/2023  PCP: Therese Suarez MD    [address] [place] [address]    Chief Complaint   Patient presents with   • Arm Pain     Pt states she has pain and numbness that radiates from her right shoulder down her arm and into her hand for the last month  Denies any injury          HPI and MDM    41 y o  female presenting with arm pain  Patient states she has had right upper extremity pain for the last month, saw her PCP and was taking naproxen without relief  No known trauma  Pain usually starts in her right shoulder and goes down her right arm, she gets tingling sensation  Sometimes starts in the right side of her neck and goes down her shoulder into her arm  No fevers or chills  No weakness  No rash  No chest pain or shortness of breath  No swelling  Differential diagnosis considered includes but not limited to fracture, dislocation, cervical radiculopathy, osteoarthritis  ED Course as of 02/08/23 2031 Wed Feb 08, 2023   1828 XR shoulder 2+ views RIGHT  Per my independent interpretation, no acute bony abnormalities  CT scan findings as below  Patient updated with all results  Feeling better upon reevaluation  Advised sports medicine follow-up, supportive care  Provided prescription for Medrol Dosepak  Return precautions discussed  Medications   lidocaine (LIDODERM) 5 % patch 1 patch (1 patch Topical Medication Applied 2/8/23 1808)   ketorolac (TORADOL) injection 15 mg (15 mg Intramuscular Given 2/8/23 1808)         Past Medical and Surgical History    Past Medical History:   Diagnosis Date   • Hearing loss - left    • Migraine    • Seizures ([facility] [address] )        History reviewed  No pertinent surgical history      Family History   Problem Relation Age of Onset   • Cancer Mother    • Arthritis Mother    • Liver disease Father        Social History     Tobacco Use   • Smoking status: Never   • Smokeless tobacco: Never   Vaping Use   • Vaping Use: Never used   Substance Use Topics   • Alcohol use: No   • Drug use: No           Allergies    No Known Allergies    Home Medications    Prior to Admission medications    Medication Sig Start Date End Date Taking? Authorizing Provider   amitriptyline (ELAVIL) 50 mg tablet TAKE 1 TABLET BY MOUTH EVERY NIGHT AT BEDTIME 11/7/22   Lavinia Webster MD   divalproex sodium (DEPAKOTE SPRINKLE) 125 MG capsule Take 1 cap by mouth twice a day 7/15/22   Penelope Agrawal MD   Chilo Ranks (Aimovig) 140 MG/ML SOAJ Inject subcutaneously monthly 3/24/22   Penelope Agrawal MD   metoclopramide (REGLAN) 10 mg tablet Take 1 tablet (10 mg total) by mouth every 6 (six) hours as needed (Headache or nausea) 3/3/22   Penelope Agrawal MD   naproxen (NAPROSYN) 500 mg tablet  1/10/23   Historical Provider, MD   predniSONE 20 mg tablet Three p o  for 2 days then 2 p o  for 1 day then 1 p o  for 1 day then stop  Patient not taking: Reported on 9/8/2022 2/17/22   Penelope Agrawal MD   promethazine (PHENERGAN) 25 mg suppository Insert 1 suppository (25 mg total) into the rectum every 6 (six) hours as needed for nausea or vomiting (Headache)  Patient not taking: Reported on 9/8/2022 2/2/22   Penelope Agrawal MD   rizatriptan (MAXALT) 10 MG tablet One p o  At headache onset, may repeat 1 after 2 hours p r n  Maximum 2/24 hours 3/21/22   Penelope Agrawal MD   SUMAtriptan (IMITREX) 100 mg tablet One p  O  At headache onset, may repeat after 2 hours p r n   Maximum 2/24 hours  Patient not taking: Reported on 9/8/2022 2/2/22   Penelope Agrawal MD           Physical Exam      ED Triage Vitals [02/08/23 1716]   Temperature Pulse Respirations Blood Pressure SpO2   (!) 97 °F (36 1 °C) 95 18 121/58 100 %      Temp Source Heart Rate Source Patient Position - Orthostatic VS BP Location FiO2 (%)   Temporal Monitor Sitting Left arm --      Pain Score       --               Physical Exam  Constitutional:       General: She is not in acute distress  Appearance: She is not ill-appearing  HENT:      Head: Normocephalic and atraumatic  Nose: Nose normal       Mouth/Throat:      Mouth: Mucous membranes are moist    Eyes:      Extraocular Movements: Extraocular movements intact  Pupils: Pupils are equal, round, and reactive to light  Cardiovascular:      Rate and Rhythm: Normal rate and regular rhythm  Comments: Radial pulse intact in right upper extremity  Pulmonary:      Effort: No respiratory distress  Breath sounds: Normal breath sounds  No wheezing  Abdominal:      General: There is no distension  Musculoskeletal:         General: No swelling, tenderness or deformity  Normal range of motion  Cervical back: Normal range of motion and neck supple  No rigidity or tenderness  Comments: Compartments are soft   Skin:     General: Skin is warm  Findings: No erythema  Neurological:      Mental Status: She is alert and oriented to person, place, and time  Mental status is at baseline  Sensory: No sensory deficit  Motor: No weakness  Diagnostic Results      Labs:    Results Reviewed     None          All labs reviewed and utilized in the medical decision making process    Radiology:    CT cervical spine without contrast   Final Result      No cervical spine fracture or traumatic malalignment  Mild cervical spondylosis without CT evidence for compressive neuropathy               Workstation performed: [id number]         XR shoulder 2+ views RIGHT    (Results Pending)       All radiology studies independently viewed by me and interpreted by the radiologist     Procedure    Procedures        FINAL IMPRESSION    Final diagnoses:   Cervical radiculopathy   Chronic right shoulder pain         DISPOSITION    Time reflects when diagnosis was documented in both MDM as applicable and the Disposition within this note     Time User Action Codes Description Comment    2/8/2023  7:42 PM Cheng Willis Add [B12 94] Cervical radiculopathy     2/8/2023  7:42 PM Cheng Willis Add [X61 871,  G89 29] Chronic right shoulder pain       ED Disposition     ED Disposition   Discharge    Condition   Stable    Date/Time   Wed Feb 8, 2023  7:43 PM    Faby Hernandez discharge to home/self care  Follow-up Information     Follow up With Specialties Details Why [address] [facility],  Sports Medicine Schedule an appointment as soon as possible for a visit in 1 week  [address] [place] [address] [address]  [facility] [address]  [phone]              PATIENT REFERRED TO:    [address] [facility]  [address] [facility] 200  [facility]  [phone]    Schedule an appointment as soon as possible for a visit in 1 week        DISCHARGE MEDICATIONS:    Discharge Medication List as of 2/8/2023  7:43 PM      START taking these medications    Details   methylPREDNISolone 4 MG tablet therapy pack Use as directed on package, Normal         CONTINUE these medications which have NOT CHANGED    Details   amitriptyline (ELAVIL) 50 mg tablet TAKE 1 TABLET BY MOUTH EVERY NIGHT AT BEDTIME, Normal      divalproex sodium (DEPAKOTE SPRINKLE) 125 MG capsule Take 1 cap by mouth twice a day, Normal      Erenumab-aooe (Aimovig) 140 MG/ML SOAJ Inject subcutaneously monthly, Normal      metoclopramide (REGLAN) 10 mg tablet Take 1 tablet (10 mg total) by mouth every 6 (six) hours as needed (Headache or nausea), Starting Thu 3/3/2022, Normal      naproxen (NAPROSYN) 500 mg tablet Starting Tue 1/10/2023, Historical Med      promethazine (PHENERGAN) 25 mg suppository Insert 1 suppository (25 mg total) into the rectum every 6 (six) hours as needed for nausea or vomiting (Headache), Starting Wed 2/2/2022, Normal      rizatriptan (MAXALT) 10 MG tablet One p o  At headache onset, may repeat 1 after 2 hours p r n   Maximum 2/24 hours, Normal      SUMAtriptan (IMITREX) 100 mg tablet One p  O  At headache onset, may repeat after 2 hours p r n  Maximum 2/24 hours, Normal         STOP taking these medications       predniSONE 20 mg tablet Comments:   Reason for Stopping:               No discharge procedures on file  Nelly Monge DO        This note was partially completed using voice recognition technology, and was scanned for gross errors; however some errors may still exist  Please contact the author with any questions or requests for clarification        Nelly Monge DO  02/08/23 2037

## 2023-05-12 DIAGNOSIS — G43.009 MIGRAINE WITHOUT AURA AND WITHOUT STATUS MIGRAINOSUS, NOT INTRACTABLE: ICD-10-CM

## 2023-05-12 RX ORDER — AMITRIPTYLINE HYDROCHLORIDE 50 MG/1
50 TABLET, FILM COATED ORAL
Qty: 30 TABLET | Refills: 5 | Status: SHIPPED | OUTPATIENT
Start: 2023-05-12

## 2023-06-19 ENCOUNTER — TELEPHONE (OUTPATIENT)
Dept: NEUROLOGY | Facility: CLINIC | Age: 48
End: 2023-06-19

## 2023-06-19 NOTE — TELEPHONE ENCOUNTER
Mom Hannah Zamudio left voicemail requesting call back regarding patient  Call returned to her [phone]  Mom is asking if she can be billed for copay  Does not get paid until 7/3/2023 and appt is 6/28/2023 and she does not have funds available  Checked with [employer]  - can bill for copay  Message left for mom informing of the same

## 2023-06-28 ENCOUNTER — OFFICE VISIT (OUTPATIENT)
Dept: NEUROLOGY | Facility: CLINIC | Age: 48
End: 2023-06-28
Payer: COMMERCIAL

## 2023-06-28 VITALS
OXYGEN SATURATION: 98 % | HEIGHT: 63 IN | BODY MASS INDEX: 25.69 KG/M2 | HEART RATE: 102 BPM | SYSTOLIC BLOOD PRESSURE: 100 MMHG | DIASTOLIC BLOOD PRESSURE: 60 MMHG | WEIGHT: 145 LBS

## 2023-06-28 DIAGNOSIS — G43.009 MIGRAINE WITHOUT AURA AND WITHOUT STATUS MIGRAINOSUS, NOT INTRACTABLE: Primary | ICD-10-CM

## 2023-06-28 PROCEDURE — 99213 OFFICE O/P EST LOW 20 MIN: CPT | Performed by: PSYCHIATRY & NEUROLOGY

## 2023-06-28 NOTE — PROGRESS NOTES
Francisco Guallpa is a 41 y o  female returns in follow-up today with a history migraine headache    Assessment:  1  Migraine without aura and without status migrainosus, not intractable        Plan:  Continue Aimovig Elavil and Depakote  Maxalt as needed  Follow-up 6 months    Discussion:  Tierra Dalotn has been stable with respect to her migraine headaches with current management  Attempts to taper Elavil and Depakote of been unsuccessful in the past   We will continue current management using Maxalt as needed and I will see her back in follow-up in 6 months      Subjective:    HPI  Tierra Dalton returns in follow-up today accompanied by her mother  She reports that since her last she has been doing well  Migraine headaches been under good control  She uses Aimovig monthly and continues to take Depakote and amitriptyline  Attempts to taper the Depakote and amitriptyline were unsuccessful  She reports some arthritis in her right arm      Past Medical History:   Diagnosis Date   • Hearing loss - left    • Migraine    • Seizures (HCC)        Family History:  Family History   Problem Relation Age of Onset   • Cancer Mother    • Arthritis Mother    • Liver disease Father        Past Surgical History:  History reviewed  No pertinent surgical history  Social History:   reports that she has never smoked  She has never used smokeless tobacco  She reports that she does not drink alcohol and does not use drugs  Allergies:  Patient has no known allergies  Current Outpatient Medications:   •  amitriptyline (ELAVIL) 50 mg tablet, Take 1 tablet (50 mg total) by mouth daily at bedtime, Disp: 30 tablet, Rfl: 5  •  divalproex sodium (DEPAKOTE SPRINKLE) 125 MG capsule, Take 1 cap by mouth twice a day, Disp: 180 capsule, Rfl: 3  •  Erenumab-aooe (Aimovig) 140 MG/ML SOAJ, Inject subcutaneously monthly, Disp: 1 mL, Rfl: 5  •  rizatriptan (MAXALT) 10 mg tablet, TAKE 1 TABLET BY MOUTH AT ONSET OF HEADACHE   MAY REPEAT 1 AFTER 2 HOURS AS "NEEDED  MAXIMUM 2/24 HOURS, Disp: 9 tablet, Rfl: 5  •  methylPREDNISolone 4 MG tablet therapy pack, Use as directed on package (Patient not taking: Reported on 6/8/2023), Disp: 21 tablet, Rfl: 0  •  metoclopramide (REGLAN) 10 mg tablet, Take 1 tablet (10 mg total) by mouth every 6 (six) hours as needed (Headache or nausea) (Patient not taking: Reported on 6/8/2023), Disp: 30 tablet, Rfl: 6  •  naproxen (NAPROSYN) 500 mg tablet, , Disp: , Rfl:   •  promethazine (PHENERGAN) 25 mg suppository, Insert 1 suppository (25 mg total) into the rectum every 6 (six) hours as needed for nausea or vomiting (Headache) (Patient not taking: Reported on 9/8/2022), Disp: 4 each, Rfl: 5  •  SUMAtriptan (IMITREX) 100 mg tablet, One p  O  At headache onset, may repeat after 2 hours p r n  Maximum 2/24 hours (Patient not taking: Reported on 9/8/2022), Disp: 9 tablet, Rfl: 5    I have reviewed the past medical, social and family history, current medications, allergies, vitals, review of systems and updated this information as appropriate today     Objective:    Vitals:  Blood pressure 100/60, pulse 102, height 5' 3\" (1 6 m), weight 65 8 kg (145 lb), SpO2 98 %  Physical Exam    Neurological Exam  GENERAL: Well-developed well-nourished woman in no acute distress  HEENT/NECK: Head is atraumatic normocephalic, neck is supple  NEUROLOGIC:  Mental Status: Awake and alert without aphasia  Cranial Nerves: Extraocular movements are full  Face is symmetrical  Coordination: Gait is stable      ROS:    Review of Systems   Constitutional: Negative for appetite change, fatigue and fever  HENT: Negative  Negative for hearing loss, tinnitus, trouble swallowing and voice change  Eyes: Negative  Negative for photophobia, pain and visual disturbance  Respiratory: Negative  Negative for shortness of breath  Cardiovascular: Negative  Negative for palpitations  Gastrointestinal: Negative  Negative for nausea and vomiting     Endocrine: " Negative  Negative for cold intolerance  Genitourinary: Negative  Negative for dysuria, frequency and urgency  Musculoskeletal: Negative for back pain, gait problem, myalgias and neck pain  Skin: Negative  Negative for rash  Allergic/Immunologic: Negative  Neurological: Negative  Negative for dizziness, tremors, seizures, syncope, facial asymmetry, speech difficulty, weakness, light-headedness, numbness and headaches  Hematological: Negative  Does not bruise/bleed easily  Psychiatric/Behavioral: Negative  Negative for confusion, hallucinations and sleep disturbance

## 2023-07-05 DIAGNOSIS — G43.009 MIGRAINE WITHOUT AURA AND WITHOUT STATUS MIGRAINOSUS, NOT INTRACTABLE: ICD-10-CM

## 2023-07-05 RX ORDER — DIVALPROEX SODIUM 125 MG/1
CAPSULE, COATED PELLETS ORAL
Qty: 180 CAPSULE | Refills: 3 | Status: SHIPPED | OUTPATIENT
Start: 2023-07-05

## 2023-09-05 DIAGNOSIS — G43.909 MIGRAINE: ICD-10-CM

## 2023-09-05 RX ORDER — METOCLOPRAMIDE 10 MG/1
TABLET ORAL
Qty: 30 TABLET | Refills: 6 | Status: SHIPPED | OUTPATIENT
Start: 2023-09-05

## 2023-09-19 DIAGNOSIS — G43.909 MIGRAINE: ICD-10-CM

## 2023-09-19 RX ORDER — ERENUMAB-AOOE 140 MG/ML
INJECTION, SOLUTION SUBCUTANEOUS
Qty: 1 ML | Refills: 11 | Status: SHIPPED | OUTPATIENT
Start: 2023-09-19

## 2023-09-19 NOTE — TELEPHONE ENCOUNTER
Mom left voicemail requesting call back regarding patient medication. Call returned to mom, [phone]. States new prescription is needed for aimovig. This needs to be sent to [id number] [place] [address] as they are providing her with medication. Call placed to [facility], [phone]. On hold for an extended period of time. Will need to call back. Per chart review, amgen safety net prescriptions can be sent to [facility]. Added this pharmacy to patient profile. Ange Michelle - please sign new script for aimovig.

## 2023-09-20 ENCOUNTER — TELEPHONE (OUTPATIENT)
Dept: NEUROLOGY | Facility: CLINIC | Age: 48
End: 2023-09-20

## 2023-09-20 NOTE — TELEPHONE ENCOUNTER
Patient's mother has been trying to contact to get a prescription for a refill of the patient's Aimovig 140 mg once a month injection. Please fax script to [facility] at [phone]. [employer]'s phone is [phone].    Please assist.

## 2024-01-12 ENCOUNTER — APPOINTMENT (OUTPATIENT)
Dept: LAB | Facility: HOSPITAL | Age: 49
End: 2024-01-12
Payer: COMMERCIAL

## 2024-01-12 DIAGNOSIS — Z00.00 ROUTINE GENERAL MEDICAL EXAMINATION AT A HEALTH CARE FACILITY: ICD-10-CM

## 2024-01-12 DIAGNOSIS — D64.9 ANEMIA, UNSPECIFIED TYPE: ICD-10-CM

## 2024-01-12 DIAGNOSIS — G43.009 MIGRAINE WITHOUT AURA AND WITHOUT STATUS MIGRAINOSUS, NOT INTRACTABLE: ICD-10-CM

## 2024-01-12 LAB
ALBUMIN SERPL BCP-MCNC: 4.2 G/DL (ref 3.5–5)
ALP SERPL-CCNC: 46 U/L (ref 34–104)
ALT SERPL W P-5'-P-CCNC: 8 U/L (ref 7–52)
ANION GAP SERPL CALCULATED.3IONS-SCNC: 5 MMOL/L
AST SERPL W P-5'-P-CCNC: 10 U/L (ref 13–39)
BASOPHILS # BLD AUTO: 0.03 THOUSANDS/ÂΜL (ref 0–0.1)
BASOPHILS NFR BLD AUTO: 1 % (ref 0–1)
BILIRUB SERPL-MCNC: 0.4 MG/DL (ref 0.2–1)
BUN SERPL-MCNC: 12 MG/DL (ref 5–25)
CALCIUM SERPL-MCNC: 8.9 MG/DL (ref 8.4–10.2)
CHLORIDE SERPL-SCNC: 104 MMOL/L (ref 96–108)
CHOLEST SERPL-MCNC: 182 MG/DL
CO2 SERPL-SCNC: 27 MMOL/L (ref 21–32)
CREAT SERPL-MCNC: 0.55 MG/DL (ref 0.6–1.3)
EOSINOPHIL # BLD AUTO: 0.05 THOUSAND/ÂΜL (ref 0–0.61)
EOSINOPHIL NFR BLD AUTO: 1 % (ref 0–6)
ERYTHROCYTE [DISTWIDTH] IN BLOOD BY AUTOMATED COUNT: 17.2 % (ref 11.6–15.1)
GFR SERPL CREATININE-BSD FRML MDRD: 112 ML/MIN/1.73SQ M
GLUCOSE P FAST SERPL-MCNC: 88 MG/DL (ref 65–99)
HCT VFR BLD AUTO: 29.4 % (ref 34.8–46.1)
HDLC SERPL-MCNC: 84 MG/DL
HGB BLD-MCNC: 8.6 G/DL (ref 11.5–15.4)
IMM GRANULOCYTES # BLD AUTO: 0.02 THOUSAND/UL (ref 0–0.2)
IMM GRANULOCYTES NFR BLD AUTO: 0 % (ref 0–2)
LDLC SERPL CALC-MCNC: 85 MG/DL (ref 0–100)
LYMPHOCYTES # BLD AUTO: 1.2 THOUSANDS/ÂΜL (ref 0.6–4.47)
LYMPHOCYTES NFR BLD AUTO: 25 % (ref 14–44)
MCH RBC QN AUTO: 22.3 PG (ref 26.8–34.3)
MCHC RBC AUTO-ENTMCNC: 29.3 G/DL (ref 31.4–37.4)
MCV RBC AUTO: 76 FL (ref 82–98)
MONOCYTES # BLD AUTO: 0.27 THOUSAND/ÂΜL (ref 0.17–1.22)
MONOCYTES NFR BLD AUTO: 6 % (ref 4–12)
NEUTROPHILS # BLD AUTO: 3.16 THOUSANDS/ÂΜL (ref 1.85–7.62)
NEUTS SEG NFR BLD AUTO: 67 % (ref 43–75)
NONHDLC SERPL-MCNC: 98 MG/DL
NRBC BLD AUTO-RTO: 0 /100 WBCS
PLATELET # BLD AUTO: 170 THOUSANDS/UL (ref 149–390)
PMV BLD AUTO: 8.5 FL (ref 8.9–12.7)
POTASSIUM SERPL-SCNC: 4 MMOL/L (ref 3.5–5.3)
PROT SERPL-MCNC: 7 G/DL (ref 6.4–8.4)
RBC # BLD AUTO: 3.86 MILLION/UL (ref 3.81–5.12)
SODIUM SERPL-SCNC: 136 MMOL/L (ref 135–147)
TRIGL SERPL-MCNC: 66 MG/DL
TSH SERPL DL<=0.05 MIU/L-ACNC: 0.68 UIU/ML (ref 0.45–4.5)
WBC # BLD AUTO: 4.73 THOUSAND/UL (ref 4.31–10.16)

## 2024-01-12 PROCEDURE — 80061 LIPID PANEL: CPT

## 2024-01-12 PROCEDURE — 83036 HEMOGLOBIN GLYCOSYLATED A1C: CPT

## 2024-01-12 PROCEDURE — 82607 VITAMIN B-12: CPT

## 2024-01-12 PROCEDURE — 83540 ASSAY OF IRON: CPT

## 2024-01-12 PROCEDURE — 84443 ASSAY THYROID STIM HORMONE: CPT

## 2024-01-12 PROCEDURE — 80053 COMPREHEN METABOLIC PANEL: CPT

## 2024-01-12 PROCEDURE — 85025 COMPLETE CBC W/AUTO DIFF WBC: CPT

## 2024-01-12 PROCEDURE — 82728 ASSAY OF FERRITIN: CPT

## 2024-01-12 PROCEDURE — 83550 IRON BINDING TEST: CPT

## 2024-01-12 PROCEDURE — 36415 COLL VENOUS BLD VENIPUNCTURE: CPT

## 2024-01-13 LAB
EST. AVERAGE GLUCOSE BLD GHB EST-MCNC: 108 MG/DL
FERRITIN SERPL-MCNC: 2 NG/ML (ref 11–307)
HBA1C MFR BLD: 5.4 %
IRON SATN MFR SERPL: 5 % (ref 15–50)
IRON SERPL-MCNC: 18 UG/DL (ref 50–212)
TIBC SERPL-MCNC: 398 UG/DL (ref 250–450)
UIBC SERPL-MCNC: 380 UG/DL (ref 155–355)
VIT B12 SERPL-MCNC: 379 PG/ML (ref 180–914)

## 2024-02-14 ENCOUNTER — OFFICE VISIT (OUTPATIENT)
Dept: NEUROLOGY | Facility: CLINIC | Age: 49
End: 2024-02-14
Payer: COMMERCIAL

## 2024-02-14 VITALS
DIASTOLIC BLOOD PRESSURE: 70 MMHG | WEIGHT: 151.2 LBS | BODY MASS INDEX: 26.79 KG/M2 | HEIGHT: 63 IN | SYSTOLIC BLOOD PRESSURE: 110 MMHG | HEART RATE: 74 BPM

## 2024-02-14 DIAGNOSIS — G43.009 MIGRAINE WITHOUT AURA AND WITHOUT STATUS MIGRAINOSUS, NOT INTRACTABLE: Primary | ICD-10-CM

## 2024-02-14 PROCEDURE — 99213 OFFICE O/P EST LOW 20 MIN: CPT | Performed by: PSYCHIATRY & NEUROLOGY

## 2024-02-14 NOTE — PROGRESS NOTES
Paulina Kaba is a 48 y.o. female returns in follow-up today with history of migraine headache    Assessment:  1. Migraine without aura and without status migrainosus, not intractable        Plan:  Continue Aimovig Depakote and amitriptyline  Maxalt as needed  Follow-up 6 months    Discussion:  Paulina has been stable from the standpoint of her migraine headaches with present management which she tolerates well.  Maxalt is effective in relieving her infrequent breakthrough headaches.  She will continue and I will see her back in 6 months      Subjective:  Paulina returns in follow-up today accompanied by her mother.  She reports that her migraine headache frequency is under good control with present management.  She states she rarely gets a headache and when she does Maxalt is effective in getting rid of it.  She tolerates her other medications without adverse effect.  She denies any new medical issues      Past Medical History:   Diagnosis Date    Hearing loss - left     Migraine     Seizures (HCC)        Family History:  Family History   Problem Relation Age of Onset    Cancer Mother     Arthritis Mother     Liver disease Father        Past Surgical History:  No past surgical history on file.    Social History:   reports that she has never smoked. She has never used smokeless tobacco. She reports that she does not drink alcohol and does not use drugs.    Allergies:  Patient has no known allergies.      Current Outpatient Medications:     amitriptyline (ELAVIL) 50 mg tablet, TAKE 1 TABLET(50 MG) BY MOUTH DAILY AT BEDTIME, Disp: 30 tablet, Rfl: 5    divalproex sodium (DEPAKOTE SPRINKLE) 125 MG capsule, TAKE 1 CAPSULE BY MOUTH TWICE DAILY, Disp: 180 capsule, Rfl: 3    Erenumab-aooe (Aimovig) 140 MG/ML SOAJ, Inject subcutaneously monthly, Disp: 1 mL, Rfl: 11    rizatriptan (MAXALT) 10 mg tablet, TAKE 1 TABLET BY MOUTH AT ONSET OF HEADACHE. MAY REPEAT 1 AFTER 2 HOURS AS NEEDED. MAXIMUM 2/24 HOURS, Disp: 9 tablet, Rfl: 5    " methylPREDNISolone 4 MG tablet therapy pack, Use as directed on package (Patient not taking: Reported on 6/8/2023), Disp: 21 tablet, Rfl: 0    metoclopramide (REGLAN) 10 mg tablet, TAKE 1 TABLET(10 MG) BY MOUTH EVERY 6 HOURS AS NEEDED FOR HEADACHE OR NAUSEA (Patient not taking: Reported on 2/14/2024), Disp: 30 tablet, Rfl: 6    naproxen (NAPROSYN) 500 mg tablet, , Disp: , Rfl:     neomycin-colistin-hydrocortisone-thonzonium (CORTISPORIN TC) 0.33-0.3-1-0.05 % otic suspension, Administer 3 drops into both ears 4 (four) times a day (Patient not taking: Reported on 2/14/2024), Disp: 10 mL, Rfl: 2    promethazine (PHENERGAN) 25 mg suppository, Insert 1 suppository (25 mg total) into the rectum every 6 (six) hours as needed for nausea or vomiting (Headache) (Patient not taking: Reported on 9/8/2022), Disp: 4 each, Rfl: 5    SUMAtriptan (IMITREX) 100 mg tablet, One p. O. At headache onset, may repeat after 2 hours p.r.n. Maximum 2/24 hours (Patient not taking: Reported on 9/8/2022), Disp: 9 tablet, Rfl: 5    I have reviewed the past medical, social and family history, current medications, allergies, vitals, review of systems and updated this information as appropriate today     Objective:    Vitals:  Blood pressure 110/70, pulse 74, height 5' 3\" (1.6 m), weight 68.6 kg (151 lb 3.2 oz).    Physical Exam    Neurological Exam  GENERAL: Well-developed well-nourished woman in no acute distress  HEENT/NECK: Head is atraumatic normocephalic, neck is supple  NEUROLOGIC:  Mental Status: Awake and alert without aphasia  Cranial Nerves: Extraocular movements are full. Face is symmetrical  Coordination: Gait is stable      ROS:    Review of Systems   Constitutional:  Negative for appetite change, fatigue and fever.   HENT: Negative.  Negative for hearing loss, tinnitus, trouble swallowing and voice change.    Eyes: Negative.  Negative for photophobia, pain and visual disturbance.   Respiratory: Negative.  Negative for shortness of " breath.    Cardiovascular: Negative.  Negative for palpitations.   Gastrointestinal: Negative.  Negative for nausea and vomiting.   Endocrine: Negative.  Negative for cold intolerance.   Genitourinary: Negative.  Negative for dysuria, frequency and urgency.   Musculoskeletal:  Negative for back pain, gait problem, myalgias, neck pain and neck stiffness.   Skin: Negative.  Negative for rash.   Allergic/Immunologic: Negative.    Neurological: Negative.  Negative for dizziness, tremors, seizures, syncope, facial asymmetry, speech difficulty, weakness, light-headedness, numbness and headaches.   Hematological: Negative.  Does not bruise/bleed easily.   Psychiatric/Behavioral: Negative.  Negative for confusion, hallucinations and sleep disturbance.

## 2024-03-18 ENCOUNTER — TELEPHONE (OUTPATIENT)
Dept: NEUROLOGY | Facility: CLINIC | Age: 49
End: 2024-03-18

## 2024-03-19 NOTE — TELEPHONE ENCOUNTER
03/19/2024 Patients daughter brought in enrollment forms for Aimovig to be completed.    Team 1 Please fill out page 5.      Danielle, please contact patient, page 4 of 5 needs to be signed by patient before nursing team can proceed.

## 2024-03-26 NOTE — TELEPHONE ENCOUNTER
Page 5 is the prescription that needs to be completed by provider.     Luis Eduardo - can you please print for ?

## 2024-03-27 NOTE — TELEPHONE ENCOUNTER
Patient came into office on 3/26/24 and signed page 4.  Forms were given to  Isabela to give to Dr. Roberson on  3/237/24 to complete, sign and fax.

## 2024-03-27 NOTE — TELEPHONE ENCOUNTER
Forms have been filled and signed by Dr. Roberson. Form has also been faxed by Danielle.  Danielle, please scan in chart. Thank you!

## 2024-04-04 DIAGNOSIS — G43.009 MIGRAINE WITHOUT AURA AND WITHOUT STATUS MIGRAINOSUS, NOT INTRACTABLE: ICD-10-CM

## 2024-04-04 RX ORDER — AMITRIPTYLINE HYDROCHLORIDE 50 MG/1
50 TABLET, FILM COATED ORAL
Qty: 30 TABLET | Refills: 5 | Status: SHIPPED | OUTPATIENT
Start: 2024-04-04

## 2024-04-24 DIAGNOSIS — G43.909 MIGRAINE: ICD-10-CM

## 2024-04-24 RX ORDER — RIZATRIPTAN BENZOATE 10 MG/1
TABLET ORAL
Qty: 9 TABLET | Refills: 5 | Status: SHIPPED | OUTPATIENT
Start: 2024-04-24

## 2024-05-10 ENCOUNTER — TELEPHONE (OUTPATIENT)
Dept: NEUROLOGY | Facility: CLINIC | Age: 49
End: 2024-05-10

## 2024-05-28 DIAGNOSIS — G43.009 MIGRAINE WITHOUT AURA AND WITHOUT STATUS MIGRAINOSUS, NOT INTRACTABLE: ICD-10-CM

## 2024-05-29 RX ORDER — DIVALPROEX SODIUM 125 MG/1
CAPSULE, COATED PELLETS ORAL
Qty: 180 CAPSULE | Refills: 3 | Status: SHIPPED | OUTPATIENT
Start: 2024-05-29 | End: 2024-06-06 | Stop reason: SDUPTHER

## 2024-06-05 ENCOUNTER — TELEPHONE (OUTPATIENT)
Dept: NEUROLOGY | Facility: CLINIC | Age: 49
End: 2024-06-05

## 2024-06-05 NOTE — TELEPHONE ENCOUNTER
Paulina and her mother are here to see if something else can be prescribed rather than DEPAKOTE SPRINKLE .  She is getting headaches from it.  It is 125 mg capsule taken 2 times a day.  Please advise.

## 2024-06-06 DIAGNOSIS — G43.009 MIGRAINE WITHOUT AURA AND WITHOUT STATUS MIGRAINOSUS, NOT INTRACTABLE: ICD-10-CM

## 2024-06-06 RX ORDER — DIVALPROEX SODIUM 125 MG/1
CAPSULE, COATED PELLETS ORAL
Qty: 360 CAPSULE | Refills: 3 | Status: SHIPPED | OUTPATIENT
Start: 2024-06-06

## 2024-07-08 ENCOUNTER — TELEPHONE (OUTPATIENT)
Dept: NEUROLOGY | Facility: CLINIC | Age: 49
End: 2024-07-08

## 2024-07-08 NOTE — TELEPHONE ENCOUNTER
LVM attempting to cancel appointment with Dr. Roberson for 8/28/24 and to reschedule with another neurologist. Gave [phone] to call back to reschedule.

## 2024-07-10 ENCOUNTER — TELEPHONE (OUTPATIENT)
Dept: NEUROLOGY | Facility: CLINIC | Age: 49
End: 2024-07-10

## 2024-07-10 NOTE — TELEPHONE ENCOUNTER
Due to circumstances Paulina's appt with Dr. Roberson needed to be rescheduled with another provider. I have placed her on your schedule if you are unable to take over her care please let me know and I will search for another provider.   Dx Migraines  Thank you

## 2024-08-14 ENCOUNTER — TELEPHONE (OUTPATIENT)
Dept: NEUROLOGY | Facility: CLINIC | Age: 49
End: 2024-08-14

## 2024-08-14 ENCOUNTER — NURSE TRIAGE (OUTPATIENT)
Age: 49
End: 2024-08-14

## 2024-08-14 NOTE — TELEPHONE ENCOUNTER
Patient called cancelled appointment with Dr Pickering.   Patient wanted to stay in ES office, rescheduled appointment with Dr Thao for 3/19 @ 3 pm. Added patient to wait list.    Patient states that she is having frequent headaches and medication is not working.  Is there anything we can do for patient since her appointment is so far out?    Please assist

## 2024-08-14 NOTE — TELEPHONE ENCOUNTER
HASEEB informing patient's mother that I would be putting Patient's name on Dr. Arthur's waiting list to schedule with her as soon as her schedule is ready.  Gave [phone] to call with any questions.  I informed her that I would be canceling the Dr. Pickering appointment scheduled for 9/10/24.

## 2024-08-14 NOTE — TELEPHONE ENCOUNTER
Patients mother Ada called back to thank you Danielle for the VM and to inform that she scheduled an appointment with Dr. Thao 3/9/25/24 3 PM Iggy for now but would still like a call with sooner appointment with Dr. Arthur or Dr. Thao.     Thank you

## 2024-08-15 DIAGNOSIS — G44.89 OTHER HEADACHE SYNDROME: Primary | ICD-10-CM

## 2024-08-15 RX ORDER — BUTALBITAL, ACETAMINOPHEN AND CAFFEINE 300; 40; 50 MG/1; MG/1; MG/1
CAPSULE ORAL
Qty: 10 CAPSULE | Refills: 0 | Status: SHIPPED | OUTPATIENT
Start: 2024-08-15

## 2024-08-15 NOTE — TELEPHONE ENCOUNTER
Jocelyn Thao MD  You2 hours ago (12:28 PM)       Discussed with patient's mother she looks like patient is a having a status migrainosus, she is already on Aimovig Depakote amitriptyline and Maxalt and has had headache for 3 days advised her to go to the ER so that she can be given a migraine cocktail and some IV fluids to help with her headache

## 2024-08-15 NOTE — TELEPHONE ENCOUNTER
Jocleyn Thao MD  You; Cristy Hwang11 minutes ago (2:20 PM)     I had discussed with patient's mother regarding Fioricet please make sure not to combine with Tylenol and to stop if experiences any side effects.

## 2024-08-15 NOTE — TELEPHONE ENCOUNTER
"Called patient to assess headache symptoms. (Patient gave verbal consent for me to speak with her mother Marylin. Added verbal consent in patient's documents).    Patient has had an ongoing migraine for 13 days with no relief. Patient gets migraines before beginning her menses and they resolve within a few days. This migraine has persisted despite trying all prescribed migraine medications that have worked in the pass. This is a typical migraine for the patient. Former patient of Dr Roberson, patient has a follow up appointment with Dr Thao on 3-19-25 and is on the wait list.     Florentin Louie.     # [phone]    Dr Thao, Please advise. Thank you!      Reason for Disposition   Similar to previously diagnosed migraine headaches    Answer Assessment - Initial Assessment Questions  When did migraine start? 4-3-24    Location/Description: Entire head. Throbbing \"like it's going to fall off.\"    Pain scale: 7    Associated symptoms: Photophobia    Precipitating factors: Menstruation. Patient has finished her menstrual cycle but headache remains.    Alleviating factors: Nothing    What medications have you tried for this migraine headache?   Ibuprofen 400 m days  Aimovig 24  Depakote  Amitriptyline  Rizatriptan 1 or 2 time at night    Educated the patient that we do not recommend she take any triptans or OTC med more than 3 days in any 1 week due to medication over use headache and CVA risk with overuse    Emphasized that if patient has any significant symptoms of vision loss/double vision, sudden sharp stabbing head pain, vomiting, confusion/disorientation, or worst headache of her life she should go to the ED immediately as soon as possible.    Current migraine medications are confirmed as:  Aimovig 24  Depakote  Amyitriptline  Rizatriptan 1 or 2 time at night    Medications tried in the past?   Decadron: No  Olanzapine:No    Protocols used: Headache-ADULT-[place]    "

## 2024-08-15 NOTE — TELEPHONE ENCOUNTER
Pt's mother called in and stated she just spoke with Dr. Thao and he mentioned a new medication pt could try (she could not remember the name ) she's requesting that medication be sent to pharmacy instead of taking pt to the ED.      [id number] - [place], [place] - [address]   [address] [facility] [id number]

## 2024-08-20 NOTE — TELEPHONE ENCOUNTER
Pts mom Marylin- (Pt gave verbal consent) called asking can the pt take more than 2x a week of the medication listed below.    The Pills are working.Pt has a headache now but has already taken the limit of 2.      Butalbital-APAP-Caffeine (Fioricet) -40 MG CAPS       Si tablet p.o. once a day as needed headache maximum twice a week               Please call:  Marylin Vee (Mother)  [phone]

## 2024-08-21 NOTE — TELEPHONE ENCOUNTER
Jocelyn Thao MD  You1 hour ago (8:57 AM)       According to Dr. Roberson's last note patient also is on Maxalt as needed

## 2024-08-21 NOTE — TELEPHONE ENCOUNTER
Spoke to Ada advised on fioricet limitation of 2 per week and if a headache occurs after the 2 have been used can then use rizatriptan, but again re-educated on limiting use as well.    Ada verbalized understanding.

## 2024-10-01 DIAGNOSIS — G43.909 MIGRAINE: ICD-10-CM

## 2024-10-01 RX ORDER — ERENUMAB-AOOE 140 MG/ML
INJECTION, SOLUTION SUBCUTANEOUS
Qty: 1 ML | Refills: 11 | Status: SHIPPED | OUTPATIENT
Start: 2024-10-01

## 2024-10-01 NOTE — TELEPHONE ENCOUNTER
Pt's mother, Ada, called. It is okay to speak with Ada per pt's most recent Medical Communication Consent form. Pt needs a new script for Aimovig sent to the pharmacy. She is due for an injection on 10/11/24. The script gets sent to [facility]. Last OV was 2/14/24 with Dr. Roberson. Next OV 3/19/25 with Dr. Thao.

## 2024-10-02 DIAGNOSIS — G44.89 OTHER HEADACHE SYNDROME: ICD-10-CM

## 2024-10-02 NOTE — TELEPHONE ENCOUNTER
Medication:Butalbital-acetaminophen-caffeine  PDMP  08/15/2024 08/15/2024 Butalbital-acetaminophen-caffeine (Capsule) 10.0 10 300 MG-50 MG-40 MG NA [employer] Commercial Insurance 0 / 0 [place]  Active agreement on file -No    Refill must be reviewed and completed by the office or provider. The refill is unable to be approved or denied by the medication management team.

## 2024-10-04 ENCOUNTER — TELEPHONE (OUTPATIENT)
Dept: NEUROLOGY | Facility: CLINIC | Age: 49
End: 2024-10-04

## 2024-10-04 NOTE — TELEPHONE ENCOUNTER
Per Dr. Thao, patient needs to be seen by a headache specialist and will not fill the Fiorcet prescription. Clerical, please assist with scheduling patient with any headache specialist. Thank you.

## 2024-10-04 NOTE — TELEPHONE ENCOUNTER
I spoke to mother Marylin and informed her that Dr Thao will not prescribe Fioricet  per request and requesting pt be seen by a Headache specialist instead to treat her migraines, rather than seeing pt in March. I informed She needs to call PCP to request Fioricet refill. Mother requesting to be scheduled in [place] office, but aware when  calls her she will discuss provider and location with  at that time. She appreciated the call back and has no further questions at this time.

## 2024-10-04 NOTE — TELEPHONE ENCOUNTER
Per Dr. Thao, patient needs to be seen by a headache specialist and will not fill this prescription. Clerical, please assist with scheduling patient with any headache specialist. Thank you.

## 2024-10-04 NOTE — TELEPHONE ENCOUNTER
Mother Ada calling in to check on the status of the Fioricet medication. Patient only has 2 tabs left and she needs to take 1 today per mother. Mother requesting Fioricet be sent to the [facility] pharmacy today. She infomred she does use Maxalt, not together when taking Fioricet. The Maxalt does not work as well for pt, she prefer's fioricet which is why she is requesting a refill.    LOV 2/14/24 Dr Roberson  Next OV 3/19/25 Dr Thao    Routed to provider to please send if agreeable, as mother would like a call back at . I also sent provider a secure chat msg as well.

## 2024-10-07 RX ORDER — BUTALBITAL, ACETAMINOPHEN AND CAFFEINE 300; 40; 50 MG/1; MG/1; MG/1
CAPSULE ORAL
Qty: 10 CAPSULE | OUTPATIENT
Start: 2024-10-07

## 2024-10-09 NOTE — TELEPHONE ENCOUNTER
Dr. Arthur,  Would you be able to provide a Fioricet refill for this patient?  She has an appt to see you in February 2025 and was a previous patient of Dr. Jimmie Roberson.    Thank you

## 2024-10-09 NOTE — TELEPHONE ENCOUNTER
Patient scheduled with Dr. Arthur for Migraines for 2/2025. Not a Ha specialist but sees for general including migraines       Can we have another Provider to renew this patient Fioricet as they have only one pill left at this point

## 2024-10-09 NOTE — TELEPHONE ENCOUNTER
Patient called in to reschedule  per the previous note     Patient needs to be seen In [place]     Patient scheduled with Dr. Arthur for Migraines for 2/2025

## 2024-10-10 NOTE — TELEPHONE ENCOUNTER
Called patient to advice that the new provider would not be able to refill the patient medication since they have never seen the patient yet     Advised the patient to call the PCP for refills of Hhadache medication to hold them until they see the new neurologist in Jan 2025    Patient agreed and will call the PCP for the refills     Apologized for the inconvenience

## 2024-10-23 ENCOUNTER — TELEPHONE (OUTPATIENT)
Dept: NEUROLOGY | Facility: CLINIC | Age: 49
End: 2024-10-23

## 2024-10-23 DIAGNOSIS — G43.009 MIGRAINE WITHOUT AURA AND WITHOUT STATUS MIGRAINOSUS, NOT INTRACTABLE: ICD-10-CM

## 2024-10-29 NOTE — TELEPHONE ENCOUNTER
Received refill request for Amitriptyline 50 mg     Medication: Amitriptyline 50 mg     Dose/Frequency: 50 mg   TAKE 1 TABLET BY MOUTH AT BEDTIME     Quantity: 30 Refills 3    Pharmacy: [facility] #[id number]  [place] - [address]     Office:   [] PCP/Provider -   [x] Speciality/Provider -     Does the patient have enough for 3 days?   [] Yes   [x] No - Send as HP to POD        Dr. Arthur, please sign pended script if in agreement. Thank you!    Last OV 2-14-24 Dr. Roberson  Next OV 2-21-25 Dr. Arthur   Show Spray Paint Technique Variable?: Yes Detail Level: Detailed Include Z78.9 (Other Specified Conditions Influencing Health Status) As An Associated Diagnosis?: No Post-Care Instructions: I reviewed with the patient in detail post-care instructions. Patient is to wear sunprotection, and avoid picking at any of the treated lesions. Pt may apply Vaseline to crusted or scabbing areas. Medical Necessity Information: It is in your best interest to select a reason for this procedure from the list below. All of these items fulfill various CMS LCD requirements except the new and changing color options. Consent: The patient's consent was obtained including but not limited to risks of crusting, scabbing, blistering, scarring, darker or lighter pigmentary change, recurrence, incomplete removal and infection. Medical Necessity Clause: This procedure was medically necessary because the lesions that were treated were: itchy/irritating Spray Paint Text: The liquid nitrogen was applied to the skin utilizing a spray paint frosting technique.

## 2024-10-29 NOTE — TELEPHONE ENCOUNTER
Would Dr Arthur refills patient's amitriptyline 50 mg?  Patient will be out by Friday.    Please assist

## 2024-10-30 RX ORDER — AMITRIPTYLINE HYDROCHLORIDE 50 MG/1
50 TABLET ORAL
Qty: 30 TABLET | Refills: 3 | Status: SHIPPED | OUTPATIENT
Start: 2024-10-30

## 2024-11-25 DIAGNOSIS — G43.009 MIGRAINE WITHOUT AURA AND WITHOUT STATUS MIGRAINOSUS, NOT INTRACTABLE: ICD-10-CM

## 2024-11-25 RX ORDER — DIVALPROEX SODIUM 125 MG/1
CAPSULE, COATED PELLETS ORAL
Qty: 360 CAPSULE | Refills: 0 | Status: SHIPPED | OUTPATIENT
Start: 2024-11-25

## 2024-11-25 NOTE — TELEPHONE ENCOUNTER
Mother is requesting a 30 day supply with refills.     Reason for call:   [x] Refill   [] Prior Auth  [] Other:     Office:   [] PCP/Provider -   [x] Specialty/Provider - Ally Arthur MD/ [facility]     Medication: divalproex sodium (DEPAKOTE SPRINKLE) 125 MG capsule / TAKE 2 CAPSULE BY MOUTH TWICE DAILY    Pharmacy: [facility] #[id number]  [place] - [address]     Does the patient have enough for 3 days?   [] Yes   [x] No - Send as HP to POD

## 2024-12-16 DIAGNOSIS — G43.909 MIGRAINE: ICD-10-CM

## 2024-12-16 RX ORDER — RIZATRIPTAN BENZOATE 10 MG/1
TABLET ORAL
Qty: 9 TABLET | Refills: 0 | Status: SHIPPED | OUTPATIENT
Start: 2024-12-16

## 2024-12-16 NOTE — TELEPHONE ENCOUNTER
Reason for call:   [x] Refill   [] Prior Auth  [] Other:     Office:   [] PCP/Provider -   [x] Specialty/Provider - Ally Arthur MD / [facility]    Medication: rizatriptan (MAXALT) 10 mg tablet / TAKE 1 TABLET BY MOUTH AT ONSET OF HEADACHE. MAY REPEAT 1 AFTER 2 HOURS AS NEEDED. MAXIMUM 2 TABLETS IN 24 HOURS     Pharmacy: [facility] #[id number]  [place] - [facility]     Does the patient have enough for 3 days?   [] Yes   [x] No - Send as HP to POD

## 2025-01-14 ENCOUNTER — APPOINTMENT (OUTPATIENT)
Dept: LAB | Facility: HOSPITAL | Age: 50
End: 2025-01-14
Payer: COMMERCIAL

## 2025-01-14 DIAGNOSIS — G43.009 MIGRAINE WITHOUT AURA AND WITHOUT STATUS MIGRAINOSUS, NOT INTRACTABLE: ICD-10-CM

## 2025-01-14 DIAGNOSIS — Z00.00 ROUTINE MEDICAL EXAM: Primary | ICD-10-CM

## 2025-01-14 DIAGNOSIS — E61.1 IRON DEFICIENCY: ICD-10-CM

## 2025-01-14 LAB
ALBUMIN SERPL BCG-MCNC: 4.3 G/DL (ref 3.5–5)
ALP SERPL-CCNC: 43 U/L (ref 34–104)
ALT SERPL W P-5'-P-CCNC: 5 U/L (ref 7–52)
ANION GAP SERPL CALCULATED.3IONS-SCNC: 4 MMOL/L (ref 4–13)
AST SERPL W P-5'-P-CCNC: 9 U/L (ref 13–39)
BASOPHILS # BLD AUTO: 0.04 THOUSANDS/ΜL (ref 0–0.1)
BASOPHILS NFR BLD AUTO: 1 % (ref 0–1)
BILIRUB SERPL-MCNC: 0.35 MG/DL (ref 0.2–1)
BUN SERPL-MCNC: 15 MG/DL (ref 5–25)
CALCIUM SERPL-MCNC: 9.3 MG/DL (ref 8.4–10.2)
CHLORIDE SERPL-SCNC: 103 MMOL/L (ref 96–108)
CHOLEST SERPL-MCNC: 182 MG/DL (ref ?–200)
CO2 SERPL-SCNC: 30 MMOL/L (ref 21–32)
CREAT SERPL-MCNC: 0.57 MG/DL (ref 0.6–1.3)
EOSINOPHIL # BLD AUTO: 0.06 THOUSAND/ΜL (ref 0–0.61)
EOSINOPHIL NFR BLD AUTO: 2 % (ref 0–6)
ERYTHROCYTE [DISTWIDTH] IN BLOOD BY AUTOMATED COUNT: 18.2 % (ref 11.6–15.1)
FERRITIN SERPL-MCNC: 3 NG/ML (ref 11–307)
GFR SERPL CREATININE-BSD FRML MDRD: 110 ML/MIN/1.73SQ M
GLUCOSE P FAST SERPL-MCNC: 77 MG/DL (ref 65–99)
HCT VFR BLD AUTO: 32.9 % (ref 34.8–46.1)
HDLC SERPL-MCNC: 82 MG/DL
HGB BLD-MCNC: 9.3 G/DL (ref 11.5–15.4)
IMM GRANULOCYTES # BLD AUTO: 0.01 THOUSAND/UL (ref 0–0.2)
IMM GRANULOCYTES NFR BLD AUTO: 0 % (ref 0–2)
IRON SATN MFR SERPL: 5 % (ref 15–50)
IRON SERPL-MCNC: 20 UG/DL (ref 50–212)
LDLC SERPL CALC-MCNC: 89 MG/DL (ref 0–100)
LYMPHOCYTES # BLD AUTO: 1.14 THOUSANDS/ΜL (ref 0.6–4.47)
LYMPHOCYTES NFR BLD AUTO: 32 % (ref 14–44)
MCH RBC QN AUTO: 21.6 PG (ref 26.8–34.3)
MCHC RBC AUTO-ENTMCNC: 28.3 G/DL (ref 31.4–37.4)
MCV RBC AUTO: 77 FL (ref 82–98)
MONOCYTES # BLD AUTO: 0.18 THOUSAND/ΜL (ref 0.17–1.22)
MONOCYTES NFR BLD AUTO: 5 % (ref 4–12)
NEUTROPHILS # BLD AUTO: 2.09 THOUSANDS/ΜL (ref 1.85–7.62)
NEUTS SEG NFR BLD AUTO: 60 % (ref 43–75)
NONHDLC SERPL-MCNC: 100 MG/DL
NRBC BLD AUTO-RTO: 0 /100 WBCS
PLATELET # BLD AUTO: 168 THOUSANDS/UL (ref 149–390)
PMV BLD AUTO: 9.2 FL (ref 8.9–12.7)
POTASSIUM SERPL-SCNC: 4 MMOL/L (ref 3.5–5.3)
PROT SERPL-MCNC: 7.2 G/DL (ref 6.4–8.4)
RBC # BLD AUTO: 4.3 MILLION/UL (ref 3.81–5.12)
SODIUM SERPL-SCNC: 137 MMOL/L (ref 135–147)
TIBC SERPL-MCNC: 432.6 UG/DL (ref 250–450)
TRANSFERRIN SERPL-MCNC: 309 MG/DL (ref 203–362)
TRIGL SERPL-MCNC: 53 MG/DL (ref ?–150)
TSH SERPL DL<=0.05 MIU/L-ACNC: 0.8 UIU/ML (ref 0.45–4.5)
UIBC SERPL-MCNC: 413 UG/DL (ref 155–355)
WBC # BLD AUTO: 3.52 THOUSAND/UL (ref 4.31–10.16)

## 2025-01-14 PROCEDURE — 85025 COMPLETE CBC W/AUTO DIFF WBC: CPT

## 2025-01-14 PROCEDURE — 80053 COMPREHEN METABOLIC PANEL: CPT

## 2025-01-14 PROCEDURE — 83550 IRON BINDING TEST: CPT

## 2025-01-14 PROCEDURE — 80061 LIPID PANEL: CPT

## 2025-01-14 PROCEDURE — 83540 ASSAY OF IRON: CPT

## 2025-01-14 PROCEDURE — 84443 ASSAY THYROID STIM HORMONE: CPT

## 2025-01-14 PROCEDURE — 82728 ASSAY OF FERRITIN: CPT

## 2025-01-14 PROCEDURE — 36415 COLL VENOUS BLD VENIPUNCTURE: CPT

## 2025-01-21 ENCOUNTER — TELEPHONE (OUTPATIENT)
Age: 50
End: 2025-01-21

## 2025-01-27 ENCOUNTER — HOSPITAL ENCOUNTER (EMERGENCY)
Facility: HOSPITAL | Age: 50
Discharge: HOME | End: 2025-01-27
Attending: STUDENT IN AN ORGANIZED HEALTH CARE EDUCATION/TRAINING PROGRAM
Payer: COMMERCIAL

## 2025-01-27 VITALS
SYSTOLIC BLOOD PRESSURE: 99 MMHG | BODY MASS INDEX: 24.98 KG/M2 | DIASTOLIC BLOOD PRESSURE: 56 MMHG | OXYGEN SATURATION: 100 % | RESPIRATION RATE: 16 BRPM | TEMPERATURE: 98 F | HEART RATE: 76 BPM | WEIGHT: 141 LBS | HEIGHT: 63 IN

## 2025-01-27 DIAGNOSIS — R51.9 HEADACHE: Primary | ICD-10-CM

## 2025-01-27 LAB
ANION GAP SERPL CALCULATED.3IONS-SCNC: 6 MMOL/L (ref 4–13)
BASOPHILS # BLD AUTO: 0.05 THOUSANDS/ÂΜL (ref 0–0.1)
BASOPHILS NFR BLD AUTO: 1 % (ref 0–1)
BUN SERPL-MCNC: 13 MG/DL (ref 5–25)
CALCIUM SERPL-MCNC: 9.3 MG/DL (ref 8.4–10.2)
CHLORIDE SERPL-SCNC: 102 MMOL/L (ref 96–108)
CO2 SERPL-SCNC: 30 MMOL/L (ref 21–32)
CREAT SERPL-MCNC: 0.64 MG/DL (ref 0.6–1.3)
EOSINOPHIL # BLD AUTO: 0.06 THOUSAND/ÂΜL (ref 0–0.61)
EOSINOPHIL NFR BLD AUTO: 1 % (ref 0–6)
ERYTHROCYTE [DISTWIDTH] IN BLOOD BY AUTOMATED COUNT: 19.4 % (ref 11.6–15.1)
GFR SERPL CREATININE-BSD FRML MDRD: 106 ML/MIN/1.73SQ M
GLUCOSE SERPL-MCNC: 87 MG/DL (ref 65–140)
HCT VFR BLD AUTO: 35.7 % (ref 34.8–46.1)
HGB BLD-MCNC: 10.3 G/DL (ref 11.5–15.4)
IMM GRANULOCYTES # BLD AUTO: 0.01 THOUSAND/UL (ref 0–0.2)
IMM GRANULOCYTES NFR BLD AUTO: 0 % (ref 0–2)
LYMPHOCYTES # BLD AUTO: 1.43 THOUSANDS/ÂΜL (ref 0.6–4.47)
LYMPHOCYTES NFR BLD AUTO: 27 % (ref 14–44)
MCH RBC QN AUTO: 22.4 PG (ref 26.8–34.3)
MCHC RBC AUTO-ENTMCNC: 28.9 G/DL (ref 31.4–37.4)
MCV RBC AUTO: 78 FL (ref 82–98)
MONOCYTES # BLD AUTO: 0.29 THOUSAND/ÂΜL (ref 0.17–1.22)
MONOCYTES NFR BLD AUTO: 6 % (ref 4–12)
NEUTROPHILS # BLD AUTO: 3.46 THOUSANDS/ÂΜL (ref 1.85–7.62)
NEUTS SEG NFR BLD AUTO: 65 % (ref 43–75)
NRBC BLD AUTO-RTO: 0 /100 WBCS
PLATELET # BLD AUTO: 200 THOUSANDS/UL (ref 149–390)
PMV BLD AUTO: 8.8 FL (ref 8.9–12.7)
POTASSIUM SERPL-SCNC: 4.3 MMOL/L (ref 3.5–5.3)
RBC # BLD AUTO: 4.6 MILLION/UL (ref 3.81–5.12)
SODIUM SERPL-SCNC: 138 MMOL/L (ref 135–147)
WBC # BLD AUTO: 5.3 THOUSAND/UL (ref 4.31–10.16)

## 2025-01-27 PROCEDURE — 99284 EMERGENCY DEPT VISIT MOD MDM: CPT | Performed by: STUDENT IN AN ORGANIZED HEALTH CARE EDUCATION/TRAINING PROGRAM

## 2025-01-27 PROCEDURE — 85025 COMPLETE CBC W/AUTO DIFF WBC: CPT | Performed by: STUDENT IN AN ORGANIZED HEALTH CARE EDUCATION/TRAINING PROGRAM

## 2025-01-27 PROCEDURE — 96365 THER/PROPH/DIAG IV INF INIT: CPT

## 2025-01-27 PROCEDURE — 80048 BASIC METABOLIC PNL TOTAL CA: CPT | Performed by: STUDENT IN AN ORGANIZED HEALTH CARE EDUCATION/TRAINING PROGRAM

## 2025-01-27 PROCEDURE — 99283 EMERGENCY DEPT VISIT LOW MDM: CPT

## 2025-01-27 PROCEDURE — 96375 TX/PRO/DX INJ NEW DRUG ADDON: CPT

## 2025-01-27 PROCEDURE — 36415 COLL VENOUS BLD VENIPUNCTURE: CPT | Performed by: STUDENT IN AN ORGANIZED HEALTH CARE EDUCATION/TRAINING PROGRAM

## 2025-01-27 PROCEDURE — 96366 THER/PROPH/DIAG IV INF ADDON: CPT

## 2025-01-27 PROCEDURE — 96372 THER/PROPH/DIAG INJ SC/IM: CPT

## 2025-01-27 RX ORDER — KETOROLAC TROMETHAMINE 30 MG/ML
30 INJECTION, SOLUTION INTRAMUSCULAR; INTRAVENOUS ONCE
Status: COMPLETED | OUTPATIENT
Start: 2025-01-27 | End: 2025-01-27

## 2025-01-27 RX ORDER — MAGNESIUM SULFATE HEPTAHYDRATE 40 MG/ML
2 INJECTION, SOLUTION INTRAVENOUS ONCE
Status: COMPLETED | OUTPATIENT
Start: 2025-01-27 | End: 2025-01-27

## 2025-01-27 RX ORDER — SUMATRIPTAN 6 MG/.5ML
6 INJECTION, SOLUTION SUBCUTANEOUS ONCE
Status: COMPLETED | OUTPATIENT
Start: 2025-01-27 | End: 2025-01-27

## 2025-01-27 RX ORDER — METOCLOPRAMIDE HYDROCHLORIDE 5 MG/ML
10 INJECTION INTRAMUSCULAR; INTRAVENOUS ONCE
Status: COMPLETED | OUTPATIENT
Start: 2025-01-27 | End: 2025-01-27

## 2025-01-27 RX ORDER — MECLIZINE HYDROCHLORIDE 25 MG/1
50 TABLET ORAL ONCE
Status: COMPLETED | OUTPATIENT
Start: 2025-01-27 | End: 2025-01-27

## 2025-01-27 RX ORDER — DIPHENHYDRAMINE HYDROCHLORIDE 50 MG/ML
25 INJECTION INTRAMUSCULAR; INTRAVENOUS ONCE
Status: COMPLETED | OUTPATIENT
Start: 2025-01-27 | End: 2025-01-27

## 2025-01-27 RX ORDER — ONDANSETRON 2 MG/ML
4 INJECTION INTRAMUSCULAR; INTRAVENOUS ONCE
Status: COMPLETED | OUTPATIENT
Start: 2025-01-27 | End: 2025-01-27

## 2025-01-27 RX ADMIN — MAGNESIUM SULFATE HEPTAHYDRATE 2 G: 40 INJECTION, SOLUTION INTRAVENOUS at 19:00

## 2025-01-27 RX ADMIN — KETOROLAC TROMETHAMINE 30 MG: 30 INJECTION, SOLUTION INTRAMUSCULAR; INTRAVENOUS at 19:00

## 2025-01-27 RX ADMIN — SODIUM CHLORIDE 1000 ML: 0.9 INJECTION, SOLUTION INTRAVENOUS at 18:56

## 2025-01-27 RX ADMIN — METOCLOPRAMIDE HYDROCHLORIDE 10 MG: 5 INJECTION INTRAMUSCULAR; INTRAVENOUS at 18:59

## 2025-01-27 RX ADMIN — ONDANSETRON 4 MG: 2 INJECTION INTRAMUSCULAR; INTRAVENOUS at 19:51

## 2025-01-27 RX ADMIN — SUMATRIPTAN 6 MG: 6 INJECTION, SOLUTION SUBCUTANEOUS at 18:59

## 2025-01-27 RX ADMIN — MECLIZINE HYDROCHLORIDE 50 MG: 25 TABLET ORAL at 19:51

## 2025-01-27 RX ADMIN — DIPHENHYDRAMINE HYDROCHLORIDE 25 MG: 50 INJECTION, SOLUTION INTRAMUSCULAR; INTRAVENOUS at 18:59

## 2025-01-28 NOTE — ED NOTES
Patient ambulated out of the ED, AAOx4 resp even and unlabored with no S$S of distress.       Bita Otoole RN  01/27/25 1147

## 2025-01-28 NOTE — DISCHARGE INSTRUCTIONS
Continues over-the-counter medication as needed for discomfort.  Alternate to Tylenol Motrin.  Continues to well-hydrated.    Follow-up with primary care physician.    Return for any new or worsening symptoms.

## 2025-01-29 DIAGNOSIS — G43.909 MIGRAINE: ICD-10-CM

## 2025-01-30 RX ORDER — RIZATRIPTAN BENZOATE 10 MG/1
TABLET ORAL
Qty: 9 TABLET | Refills: 0 | Status: SHIPPED | OUTPATIENT
Start: 2025-01-30

## 2025-02-13 ENCOUNTER — TELEPHONE (OUTPATIENT)
Dept: NEUROLOGY | Facility: CLINIC | Age: 50
End: 2025-02-13

## 2025-02-13 NOTE — TELEPHONE ENCOUNTER
"Rizatriptan Prior Authorization    KEY: [id number]    \"The patient currently has access to the requested medication and a Prior Authorization is not needed for the patient/medication.\"  --------------------------------------------------------------------------  Phone call to [facility] pharmacist. Advised same. Pharmacist did not know why we received the letter stating Rizatriptan was not covered.      "
Reason for call:   [x] Prior Auth  [] Other:     Caller:  [] Patient  [x] Pharmacy  Name:   Address:   Callback Number:     Medication:     rizatriptan (MAXALT) 10 mg TAKE 1 TABLET BY MOUTH AT ONSET OF HEADACHE. MAY REPEAT 1 AFTER 2 HOURS AS NEEDED          Ordering Provider:   [] PCP/Provider -   [x] Speciality/Provider - [facility]     Has the patient tried other medications and failed? If failed, which medications did they fail?    [x] No   [] Yes - Imitrex     Is the patient's insurance updated in EPIC?   [x] Yes   [] No     Is a copy of the patient's insurance scanned in EPIC?   [x] Yes   [] No      
Received Drug change request for medication Rizatriptan, forms have been scanned into the patients chart.   
PMD

## 2025-02-16 NOTE — ED PROVIDER NOTES
Time reflects when diagnosis was documented in both MDM as applicable and the Disposition within this note       Time User Action Codes Description Comment    1/27/2025 10:35 PM Juanita Huynh Add [R51.9] Headache           ED Disposition       ED Disposition   Discharge    Condition   Stable    Date/Time   Mon Jan 27, 2025 10:35 PM    Comment   Paulina Kaba discharge to home/self care.                   Assessment & Plan       Medical Decision Making  Differential migraine with aura, cluster headache, tension headache.    Patient is a well-appearing 49-year-old female present emerged department no acute respiratory distress and vital signs unremarkable.  Patient has unremarkable neurologic exam.  Patient provided with a migraine cocktail and had relief after interventions.  Discussed overall symptomatic manage return follow-up precautions.  Disposition discharge    Amount and/or Complexity of Data Reviewed  Labs: ordered.    Risk  Prescription drug management.             Medications   ketorolac (TORADOL) injection 30 mg (30 mg Intravenous Given 1/27/25 1900)   metoclopramide (REGLAN) injection 10 mg (10 mg Intravenous Given 1/27/25 1859)   diphenhydrAMINE (BENADRYL) injection 25 mg (25 mg Intravenous Given 1/27/25 1859)   SUMAtriptan (IMITREX) subcutaneous injection 6 mg (6 mg Subcutaneous Given 1/27/25 1859)   magnesium sulfate 2 g/50 mL IVPB (premix) 2 g (0 g Intravenous Stopped 1/27/25 2246)   sodium chloride 0.9 % bolus 1,000 mL (0 mL Intravenous Stopped 1/27/25 1934)   meclizine (ANTIVERT) tablet 50 mg (50 mg Oral Given 1/27/25 1951)   ondansetron (ZOFRAN) injection 4 mg (4 mg Intravenous Given 1/27/25 1951)       ED Risk Strat Scores                                                History of Present Illness       Chief Complaint   Patient presents with    Headache     Pt arrives c/o HA x1 week. Was seen by PCP for same. Denies relief w/ prescribed medications. Pt also reports lack of appetite.        Past  Medical History:   Diagnosis Date    Hearing loss - left     Migraine     Seizures (HCC)       History reviewed. No pertinent surgical history.   Family History   Problem Relation Age of Onset    Cancer Mother     Arthritis Mother     Liver disease Father       Social History     Tobacco Use    Smoking status: Never    Smokeless tobacco: Never   Vaping Use    Vaping status: Never Used   Substance Use Topics    Alcohol use: No    Drug use: No      E-Cigarette/Vaping    E-Cigarette Use Never User       E-Cigarette/Vaping Substances    Nicotine No     THC No     CBD No     Flavoring No     Other No     Unknown No       I have reviewed and agree with the history as documented.     HPI    Patient is a 49-year-old female present emerged department for a headache that has lasted over the last week.  Patient has seen her primary care physician for similar symptoms.  She has used over-the-counter medications without much relief.  Decreased oral intake.  Denies any nausea or vomiting.  Denies any vision changes or difficulty ambulating.  Presents due to persistence of discomfort.  History includes seizures and migraine.    Review of Systems   Constitutional:  Negative for chills and fever.   HENT:  Negative for ear pain and sore throat.    Eyes:  Negative for pain and visual disturbance.   Respiratory:  Negative for cough and shortness of breath.    Cardiovascular:  Negative for chest pain and palpitations.   Gastrointestinal:  Negative for abdominal pain and vomiting.   Genitourinary:  Negative for dysuria and hematuria.   Musculoskeletal:  Negative for arthralgias and back pain.   Skin:  Negative for color change and rash.   Neurological:  Positive for headaches. Negative for seizures and syncope.   All other systems reviewed and are negative.          Objective       ED Triage Vitals   Temperature Pulse Blood Pressure Respirations SpO2 Patient Position - Orthostatic VS   01/27/25 1636 01/27/25 1636 01/27/25 1636 01/27/25  1636 01/27/25 1636 01/27/25 1636   98 °F (36.7 °C) 104 120/60 18 100 % Sitting      Temp Source Heart Rate Source BP Location FiO2 (%) Pain Score    01/27/25 1636 01/27/25 1636 01/27/25 1636 -- 01/27/25 1934    Temporal Monitor Left arm  5      Vitals      Date and Time Temp Pulse SpO2 Resp BP Pain Score FACES Pain Rating User   01/27/25 2210 -- 76 100 % 16 99/56 3 -- AN   01/27/25 2110 -- 83 100 % -- 104/55 -- -- AN   01/27/25 1934 -- 80 100 % 20 109/57 5 -- RCC   01/27/25 1636 98 °F (36.7 °C) 104 100 % 18 120/60 -- -- RO            Physical Exam  Vitals and nursing note reviewed.   Constitutional:       General: She is not in acute distress.     Appearance: She is well-developed.   HENT:      Head: Normocephalic and atraumatic.   Eyes:      Conjunctiva/sclera: Conjunctivae normal.   Cardiovascular:      Rate and Rhythm: Normal rate and regular rhythm.      Heart sounds: No murmur heard.  Pulmonary:      Effort: Pulmonary effort is normal. No respiratory distress.      Breath sounds: Normal breath sounds.   Abdominal:      Palpations: Abdomen is soft.      Tenderness: There is no abdominal tenderness.   Musculoskeletal:         General: No swelling.      Cervical back: Neck supple.   Skin:     General: Skin is warm and dry.      Capillary Refill: Capillary refill takes less than 2 seconds.   Neurological:      General: No focal deficit present.      Mental Status: She is alert and oriented to person, place, and time.      Comments: Cranial nerves II through XII intact.  Strength, sensation range of motion intact in bilateral upper and lower extremities.  Negative finger-nose-finger and heel-to-shin.  Neuroexam unremarkable apart from the history of left-sided hearing loss.     Psychiatric:         Mood and Affect: Mood normal.         Results Reviewed       Procedure Component Value Units Date/Time    Basic metabolic panel [[id number]] Collected: 01/27/25 1855    Lab Status: Final result Specimen: Blood from Arm,  Right Updated: 25     Sodium 138 mmol/L      Potassium 4.3 mmol/L      Chloride 102 mmol/L      CO2 30 mmol/L      ANION GAP 6 mmol/L      BUN 13 mg/dL      Creatinine 0.64 mg/dL      Glucose 87 mg/dL      Calcium 9.3 mg/dL      eGFR 106 ml/min/1.73sq m     Narrative:      National Kidney Disease Foundation guidelines for Chronic Kidney Disease (CKD):     Stage 1 with normal or high GFR (GFR > 90 mL/min/1.73 square meters)    Stage 2 Mild CKD (GFR = 60-89 mL/min/1.73 square meters)    Stage 3A Moderate CKD (GFR = 45-59 mL/min/1.73 square meters)    Stage 3B Moderate CKD (GFR = 30-44 mL/min/1.73 square meters)    Stage 4 Severe CKD (GFR = 15-29 mL/min/1.73 square meters)    Stage 5 End Stage CKD (GFR <15 mL/min/1.73 square meters)  Note: GFR calculation is accurate only with a steady state creatinine    CBC and differential [[id number]]  (Abnormal) Collected: 25    Lab Status: Final result Specimen: Blood from Arm, Right Updated: 25     WBC 5.30 Thousand/uL      RBC 4.60 Million/uL      Hemoglobin 10.3 g/dL      Hematocrit 35.7 %      MCV 78 fL      MCH 22.4 pg      MCHC 28.9 g/dL      RDW 19.4 %      MPV 8.8 fL      Platelets 200 Thousands/uL      nRBC 0 /100 WBCs      Segmented % 65 %      Immature Grans % 0 %      Lymphocytes % 27 %      Monocytes % 6 %      Eosinophils Relative 1 %      Basophils Relative 1 %      Absolute Neutrophils 3.46 Thousands/µL      Absolute Immature Grans 0.01 Thousand/uL      Absolute Lymphocytes 1.43 Thousands/µL      Absolute Monocytes 0.29 Thousand/µL      Eosinophils Absolute 0.06 Thousand/µL      Basophils Absolute 0.05 Thousands/µL             No orders to display       Procedures    ED Medication and Procedure Management   Prior to Admission Medications   Prescriptions Last Dose Informant Patient Reported? Taking?   Butalbital-APAP-Caffeine (Fioricet) -40 MG CAPS  Mother, Self No No   Si tablet p.o. once a day as needed headache maximum  twice a week   Erenumab-aooe (Aimovig) 140 MG/ML SOAJ  Self, Mother No No   Sig: Inject 140 mg subcutaneously monthly   SUMAtriptan (IMITREX) 100 mg tablet  Self, Mother No No   Sig: One p. O. At headache onset, may repeat after 2 hours p.r.n. Maximum 2/24 hours   Patient not taking: Reported on 9/8/2022   amitriptyline (ELAVIL) 50 mg tablet  Self, Mother No No   Sig: Take 1 tablet (50 mg total) by mouth daily at bedtime   divalproex sodium (DEPAKOTE SPRINKLE) 125 MG capsule  Self, Mother No No   Sig: TAKE 2 CAPSULE BY MOUTH TWICE DAILY   methylPREDNISolone 4 MG tablet therapy pack  Self, Mother No No   Sig: Use as directed on package   Patient not taking: Reported on 6/8/2023   metoclopramide (REGLAN) 10 mg tablet  Mother, Self No No   Sig: TAKE 1 TABLET(10 MG) BY MOUTH EVERY 6 HOURS AS NEEDED FOR HEADACHE OR NAUSEA   Patient not taking: No sig reported   naproxen (NAPROSYN) 500 mg tablet  Self, Mother Yes No   Patient not taking: Reported on 6/8/2023   ofloxacin (OCUFLOX) 0.3 % ophthalmic solution   No No   Sig: Instill 5 drops in affected ear two times a day for one week   promethazine (PHENERGAN) 25 mg suppository  Self, Mother No No   Sig: Insert 1 suppository (25 mg total) into the rectum every 6 (six) hours as needed for nausea or vomiting (Headache)   Patient not taking: Reported on 9/8/2022      Facility-Administered Medications: None     Discharge Medication List as of 1/27/2025 10:46 PM        CONTINUE these medications which have NOT CHANGED    Details   amitriptyline (ELAVIL) 50 mg tablet Take 1 tablet (50 mg total) by mouth daily at bedtime, Starting Wed 10/30/2024, Normal      Butalbital-APAP-Caffeine (Fioricet) -40 MG CAPS 1 tablet p.o. once a day as needed headache maximum twice a week, Normal      divalproex sodium (DEPAKOTE SPRINKLE) 125 MG capsule TAKE 2 CAPSULE BY MOUTH TWICE DAILY, Normal      Erenumab-aooe (Aimovig) 140 MG/ML SOAJ Inject 140 mg subcutaneously monthly, Normal       methylPREDNISolone 4 MG tablet therapy pack Use as directed on package, Normal      metoclopramide (REGLAN) 10 mg tablet TAKE 1 TABLET(10 MG) BY MOUTH EVERY 6 HOURS AS NEEDED FOR HEADACHE OR NAUSEA, Normal      naproxen (NAPROSYN) 500 mg tablet Starting Tue 1/10/2023, Historical Med      ofloxacin (OCUFLOX) 0.3 % ophthalmic solution Instill 5 drops in affected ear two times a day for one week, Normal      promethazine (PHENERGAN) 25 mg suppository Insert 1 suppository (25 mg total) into the rectum every 6 (six) hours as needed for nausea or vomiting (Headache), Starting Wed 2/2/2022, Normal      SUMAtriptan (IMITREX) 100 mg tablet One p. O. At headache onset, may repeat after 2 hours p.r.n. Maximum 2/24 hours, Normal      rizatriptan (MAXALT) 10 mg tablet TAKE 1 TABLET BY MOUTH AT ONSET OF HEADACHE. MAY REPEAT 1 AFTER 2 HOURS AS NEEDED. MAXIMUM 2 TABLETS IN 24 HOURS, Normal           No discharge procedures on file.  ED SEPSIS DOCUMENTATION   Time reflects when diagnosis was documented in both MDM as applicable and the Disposition within this note       Time User Action Codes Description Comment    1/27/2025 10:35 PM Juanita Huynh [R51.9] Headache                  Juanita Huynh DO  02/16/25 0105

## 2025-02-21 ENCOUNTER — OFFICE VISIT (OUTPATIENT)
Dept: NEUROLOGY | Facility: CLINIC | Age: 50
End: 2025-02-21
Payer: COMMERCIAL

## 2025-02-21 VITALS
HEIGHT: 63 IN | OXYGEN SATURATION: 99 % | SYSTOLIC BLOOD PRESSURE: 102 MMHG | HEART RATE: 93 BPM | WEIGHT: 143 LBS | DIASTOLIC BLOOD PRESSURE: 70 MMHG | BODY MASS INDEX: 25.34 KG/M2

## 2025-02-21 DIAGNOSIS — G43.009 MIGRAINE WITHOUT AURA AND WITHOUT STATUS MIGRAINOSUS, NOT INTRACTABLE: Primary | ICD-10-CM

## 2025-02-21 DIAGNOSIS — G40.909 SEIZURE DISORDER (HCC): ICD-10-CM

## 2025-02-21 PROCEDURE — 99417 PROLNG OP E/M EACH 15 MIN: CPT | Performed by: STUDENT IN AN ORGANIZED HEALTH CARE EDUCATION/TRAINING PROGRAM

## 2025-02-21 PROCEDURE — G2211 COMPLEX E/M VISIT ADD ON: HCPCS | Performed by: STUDENT IN AN ORGANIZED HEALTH CARE EDUCATION/TRAINING PROGRAM

## 2025-02-21 PROCEDURE — 99215 OFFICE O/P EST HI 40 MIN: CPT | Performed by: STUDENT IN AN ORGANIZED HEALTH CARE EDUCATION/TRAINING PROGRAM

## 2025-02-21 RX ORDER — RIZATRIPTAN BENZOATE 10 MG/1
TABLET ORAL
Qty: 9 TABLET | Refills: 5 | Status: SHIPPED | OUTPATIENT
Start: 2025-02-21

## 2025-02-21 RX ORDER — AMITRIPTYLINE HYDROCHLORIDE 50 MG/1
50 TABLET ORAL
Qty: 90 TABLET | Refills: 3 | Status: SHIPPED | OUTPATIENT
Start: 2025-02-21 | End: 2026-02-16

## 2025-02-21 RX ORDER — CEFUROXIME AXETIL 250 MG/1
6 TABLET ORAL DAILY PRN
Qty: 0.5 ML | Refills: 5 | Status: SHIPPED | OUTPATIENT
Start: 2025-02-21 | End: 2025-03-23

## 2025-02-21 NOTE — ASSESSMENT & PLAN NOTE
- start sumatriptan 6mg injection PRN migraine  - continue rizatriptan 10mg PRN migraine  - continue amitriptyline 50mg  - continue Aimovig  - continue metoclopramide 10mg PRN migraine  - continue Depakote 250mg BID  Orders:    amitriptyline (ELAVIL) 50 mg tablet; Take 1 tablet (50 mg total) by mouth daily at bedtime    SUMAtriptan Succinate 6 MG/0.5ML SOAJ; Inject 0.5 mL (6 mg total) under the skin daily as needed (for migraine)    rizatriptan (MAXALT) 10 mg tablet; TAKE 1 TABLET BY MOUTH AT ONSET OF HEADACHE. MAY REPEAT 1 AFTER 2 HOURS AS NEEDED. MAXIMUM 2 TABLETS IN 24 HOURS

## 2025-02-21 NOTE — PROGRESS NOTES
Neurology Ambulatory Visit  Name: Paulina Kaba       : 1975       MRN: [id number]   Encounter Provider: Ally Arthur MD   Encounter Date: 2025  Encounter department: [facility]    Paulina Kaba is a 49 y.o. female with PMH of migraines and epilepsy who presents for MERRILL from Dr. Roberson for episodic migraines. She is requesting something stronger for when her migraines do not respond to rizatriptan, so we can try sumatriptan injections.    Assessment & Plan  Migraine without aura and without status migrainosus, not intractable  - start sumatriptan 6mg injection PRN migraine  - continue rizatriptan 10mg PRN migraine  - continue amitriptyline 50mg  - continue Aimovig  - continue metoclopramide 10mg PRN migraine  - continue Depakote 250mg BID  Orders:    amitriptyline (ELAVIL) 50 mg tablet; Take 1 tablet (50 mg total) by mouth daily at bedtime    SUMAtriptan Succinate 6 MG/0.5ML SOAJ; Inject 0.5 mL (6 mg total) under the skin daily as needed (for migraine)    rizatriptan (MAXALT) 10 mg tablet; TAKE 1 TABLET BY MOUTH AT ONSET OF HEADACHE. MAY REPEAT 1 AFTER 2 HOURS AS NEEDED. MAXIMUM 2 TABLETS IN 24 HOURS    Seizure disorder (HCC)  - continue Depakote 250mg BID       RTC 6 mo      HISTORY OF PRESENT ILLNESS    She presents today with her mother. She previously saw Dr. Roberson. She has episodic migraines and takes rizatriptan, which usually works, but recently had a severe migraine not responding to medication so received a migraine cocktail in the ED (25). She says there was something they gave her there that really helped. She gets nausea with her migraines but not photophobia.     She feels that amitriptyline and Aimovig have helped her.     Severity: 10/10  Duration: all day  Triggers: none  Aura: no  Associated Signs: nausea, dizzy  Headache days per month: 1-    Abortives tried (per chart review): sumatriptan oral, rizatriptan, zolmitriptan,  metoclopramide, naproxen, phenargen, Excedrin, Aleve  Preventives tried (per chart review): verapamil, propranolol    She tells me that she has a history of seizures as a teenager, in which she would stare and not respond. She has not had a seizure since she was a teenager. She once tried to come off of Depakote but her headaches became worse, so she continues it now. She herself is not aware of her seizures and her mother does not remember them well because they were a long time ago.    Prior Work-up:   MRI brain wo contrast 11/26/14: Personally reviewed, there are some scattered nonspecific white matter lesions, some of which are ovoid and larger  Numerous small nonspecific T2 and FLAIR hyperintense foci involving supratentorial white matter   Chronic microangiopathic changes related to migraine headaches, MS or   Lyme disease considered possible etiologies   Clinical correlation recommended   No acute pathology identified by diffusion imaging     Epilepsy Risk Factors: Patient is a product of uncomplicated full term pregnancy. She was delayed in milestones, walked at 18 months and talked after 4 years old. She took some special classes for learning disability. No h/o febrile seizures, CNS infections, strokes, or CNS neoplasms.  There is no family h/o of seizures or epilepsy.    Past Medical History:    Past Medical History:   Diagnosis Date    Hearing loss - left     Migraine     Seizures (HCC)      History reviewed. No pertinent surgical history.    Family History:  Family History   Problem Relation Age of Onset    Cancer Mother     Arthritis Mother     Liver disease Father        Social History:  Social History     Tobacco Use    Smoking status: Never    Smokeless tobacco: Never   Vaping Use    Vaping status: Never Used   Substance Use Topics    Alcohol use: No    Drug use: No        Allergies:  No Known Allergies    Medications:    Current Outpatient Medications:     amitriptyline (ELAVIL) 50 mg tablet, Take 1  "tablet (50 mg total) by mouth daily at bedtime, Disp: 30 tablet, Rfl: 3    divalproex sodium (DEPAKOTE SPRINKLE) 125 MG capsule, TAKE 2 CAPSULE BY MOUTH TWICE DAILY, Disp: 360 capsule, Rfl: 0    Erenumab-aooe (Aimovig) 140 MG/ML SOAJ, Inject 140 mg subcutaneously monthly, Disp: 1 mL, Rfl: 11    naproxen (NAPROSYN) 500 mg tablet, , Disp: , Rfl:     ofloxacin (OCUFLOX) 0.3 % ophthalmic solution, Instill 5 drops in affected ear two times a day for one week, Disp: 5 mL, Rfl: 0    rizatriptan (MAXALT) 10 mg tablet, TAKE 1 TABLET BY MOUTH AT ONSET OF HEADACHE. MAY REPEAT 1 AFTER 2 HOURS AS NEEDED. MAXIMUM 2 TABLETS IN 24 HOURS, Disp: 9 tablet, Rfl: 0    metoclopramide (REGLAN) 10 mg tablet, TAKE 1 TABLET(10 MG) BY MOUTH EVERY 6 HOURS AS NEEDED FOR HEADACHE OR NAUSEA (Patient not taking: No sig reported), Disp: 30 tablet, Rfl: 6      OBJECTIVE  /70 (BP Location: Left arm, Patient Position: Sitting, Cuff Size: Standard)   Pulse 93   Ht 5' 3\" (1.6 m)   Wt 64.9 kg (143 lb)   LMP 07/11/2017   SpO2 99%   BMI 25.33 kg/m²      Labs  I have reviewed pertinent labs:  CBC:   Lab Results   Component Value Date    WBC 5.30 01/27/2025    RBC 4.60 01/27/2025    HGB 10.3 (L) 01/27/2025    HCT 35.7 01/27/2025    MCV 78 (L) 01/27/2025     01/27/2025    MCH 22.4 (L) 01/27/2025    MCHC 28.9 (L) 01/27/2025    RDW 19.4 (H) 01/27/2025    MPV 8.8 (L) 01/27/2025    NEUTROABS 3.46 01/27/2025     CMP:   Lab Results   Component Value Date    SODIUM 138 01/27/2025    K 4.3 01/27/2025     01/27/2025    CO2 30 01/27/2025    AGAP 6 01/27/2025    BUN 13 01/27/2025    CREATININE 0.64 01/27/2025    GLUC 87 01/27/2025    GLUF 77 01/14/2025    CALCIUM 9.3 01/27/2025    AST 9 (L) 01/14/2025    ALT 5 (L) 01/14/2025    ALKPHOS 43 01/14/2025    TP 7.2 01/14/2025    ALB 4.3 01/14/2025    TBILI 0.35 01/14/2025    EGFR 106 01/27/2025       Lab Results   Component Value Date/Time    [id number] 379 01/12/2024 04:36 PM    TSH 1.01 06/26/2018 " 05:01 PM    UTJ6PBDHXFBN 0.802 01/14/2025 02:04 PM    HGBA1C 5.4 01/12/2024 04:36 PM              General Exam  GENERAL APPEARANCE:  No distress, alert, interactive and cooperative.  CARDIOVASCULAR: Warm and well perfused  LUNGS: normal work of breathing on room air  EXTREMITIES: no peripheral edema     Neurologic Exam  MENTAL STATE:  Orientation was normal to time, place and person. Recent and remote memory was intact.  Attention span and concentration were normal. Language testing was normal for comprehension, repetition, expression, and naming.      CRANIAL NERVES:  CN 2       Fundoscopic: Optic discs were not well visualized due to miosis.   CN 3, 4, 6  Pupils round, 4 mm in diameter, equally reactive to light. Lids symmetric; no ptosis. EOMs normal alignment, full range. No nystagmus.  CN 5  Facial sensation intact bilaterally.  CN 7  Normal and symmetric facial strength.   CN 8  Uses hearing aids.  CN 9  Palate elevates symmetrically.  CN 11  Normal strength of shoulder shrug and neck turning.  CN 12  Tongue midline, with normal bulk and strength.     MOTOR:  Muscle bulk and tone were normal in both upper and lower extremities. Muscle strength was 5/5 in distal and proximal muscles in both upper and lower extremities. No fasciculations, tremor or other abnormal movements were present.     REFLEXES:  RIGHT UE   LEFT UE  BR:2              BR:2    Biceps:2      Biceps:2    Triceps:2     Triceps:2       RIGHT LLE   LEFT LLE    Knee:2           Knee:2    Ankle:2         Ankle:2    Babinski: toes downgoing to plantar stimulation. No clonus.     SENSORY:  In both upper and lower extremities, sensation was intact to light touch.     COORDINATION:   In both upper extremities, finger-nose-finger was intact without dysmetria or overshoot. In both lower extremities, heel-to-shin was intact.     GAIT:  Station was stable with a normal base. Gait was stable with a normal arm swing and speed. Tandem gait was intact.      Administrative Statements   The total amount of time spent with the patient and on chart review and documentation was 55 minutes. Issues addressed during this visit included counseling on medical management and counseling on medication side effects.

## 2025-05-15 DIAGNOSIS — G43.009 MIGRAINE WITHOUT AURA AND WITHOUT STATUS MIGRAINOSUS, NOT INTRACTABLE: ICD-10-CM

## 2025-05-17 RX ORDER — DIVALPROEX SODIUM 125 MG/1
CAPSULE, COATED PELLETS ORAL
Qty: 360 CAPSULE | Refills: 0 | Status: SHIPPED | OUTPATIENT
Start: 2025-05-17 | End: 2025-05-21

## 2025-05-21 NOTE — TELEPHONE ENCOUNTER
Inbound call received by Patient mother to request script adjustment, Patient is reported to be taking Depakote 125 mg twice a day since last office visit in February.     Medication:   divalproex sodium (DEPAKOTE SPRINKLE) 125 MG capsule     Dose/Frequency: 125 mg twice a day    Quantity: 180    Pharmacy: [facility] [address] [place] [address] [facility] [id number]     Office:   [] PCP/Provider -   [x] Speciality/Provider - Dr. Arthur    Does the patient have enough for 3 days?   [x] Yes   [] No - Send as HP to POD    Dr Arthur please send script if agreeable. Thank you!

## 2025-05-22 RX ORDER — DIVALPROEX SODIUM 125 MG/1
125 CAPSULE, COATED PELLETS ORAL EVERY 12 HOURS SCHEDULED
Qty: 180 CAPSULE | Refills: 0 | Status: SHIPPED | OUTPATIENT
Start: 2025-05-22

## 2025-05-22 NOTE — TELEPHONE ENCOUNTER
Outbound call made to [facility] and a  clarified they are temporarily closed due to emergency. Customer Care line could not clarify if/when they will reopen.     Outbound call made to Patient Mother and she said she got the same message from them. Patient Mother does not wish to go to another pharmacy at this time and will follow up with the pharmacy tomorrow. Kaleet encouraged to call us if a script is needed to be sent elsewhere. Patient mother is agreeable.

## 2025-08-02 DIAGNOSIS — G43.009 MIGRAINE WITHOUT AURA AND WITHOUT STATUS MIGRAINOSUS, NOT INTRACTABLE: ICD-10-CM

## 2025-08-04 RX ORDER — DIVALPROEX SODIUM 125 MG/1
125 CAPSULE, COATED PELLETS ORAL 2 TIMES DAILY
Qty: 180 CAPSULE | Refills: 0 | Status: SHIPPED | OUTPATIENT
Start: 2025-08-04 | End: 2025-08-14